# Patient Record
Sex: FEMALE | Race: WHITE | NOT HISPANIC OR LATINO | URBAN - METROPOLITAN AREA
[De-identification: names, ages, dates, MRNs, and addresses within clinical notes are randomized per-mention and may not be internally consistent; named-entity substitution may affect disease eponyms.]

---

## 2017-04-01 ENCOUNTER — INPATIENT (INPATIENT)
Facility: HOSPITAL | Age: 82
LOS: 3 days | Discharge: NURSING, REHAB OR LONG-TERM CARE | DRG: 481 | End: 2017-04-05
Attending: ORTHOPAEDIC SURGERY | Admitting: ORTHOPAEDIC SURGERY
Payer: MEDICARE

## 2017-04-01 VITALS
RESPIRATION RATE: 18 BRPM | DIASTOLIC BLOOD PRESSURE: 66 MMHG | HEART RATE: 89 BPM | SYSTOLIC BLOOD PRESSURE: 122 MMHG | OXYGEN SATURATION: 98 % | TEMPERATURE: 98 F

## 2017-04-01 DIAGNOSIS — E87.1 HYPO-OSMOLALITY AND HYPONATREMIA: ICD-10-CM

## 2017-04-01 DIAGNOSIS — D72.829 ELEVATED WHITE BLOOD CELL COUNT, UNSPECIFIED: ICD-10-CM

## 2017-04-01 LAB
ALBUMIN SERPL ELPH-MCNC: 3.1 G/DL — LOW (ref 3.4–5)
ALP SERPL-CCNC: 91 U/L — SIGNIFICANT CHANGE UP (ref 40–120)
ALT FLD-CCNC: 26 U/L — SIGNIFICANT CHANGE UP (ref 12–42)
ANION GAP SERPL CALC-SCNC: 10 MMOL/L — SIGNIFICANT CHANGE UP (ref 9–16)
APPEARANCE UR: CLEAR — SIGNIFICANT CHANGE UP
APTT BLD: 43 SEC — HIGH (ref 27.5–37.4)
AST SERPL-CCNC: 31 U/L — SIGNIFICANT CHANGE UP (ref 15–37)
BASOPHILS NFR BLD AUTO: 0.1 % — SIGNIFICANT CHANGE UP (ref 0–2)
BILIRUB SERPL-MCNC: 0.3 MG/DL — SIGNIFICANT CHANGE UP (ref 0.2–1.2)
BILIRUB UR-MCNC: NEGATIVE — SIGNIFICANT CHANGE UP
BLD GP AB SCN SERPL QL: NEGATIVE — SIGNIFICANT CHANGE UP
BUN SERPL-MCNC: 14 MG/DL — SIGNIFICANT CHANGE UP (ref 7–23)
CALCIUM SERPL-MCNC: 8.1 MG/DL — LOW (ref 8.5–10.5)
CHLORIDE SERPL-SCNC: 96 MMOL/L — SIGNIFICANT CHANGE UP (ref 96–108)
CO2 SERPL-SCNC: 25 MMOL/L — SIGNIFICANT CHANGE UP (ref 22–31)
COLOR SPEC: YELLOW — SIGNIFICANT CHANGE UP
CREAT SERPL-MCNC: 0.71 MG/DL — SIGNIFICANT CHANGE UP (ref 0.5–1.3)
DIFF PNL FLD: NEGATIVE — SIGNIFICANT CHANGE UP
GLUCOSE SERPL-MCNC: 156 MG/DL — HIGH (ref 70–99)
GLUCOSE UR QL: NEGATIVE — SIGNIFICANT CHANGE UP
HCT VFR BLD CALC: 28.6 % — LOW (ref 34.5–45)
HGB BLD-MCNC: 9.5 G/DL — LOW (ref 11.5–15.5)
INR BLD: 1.37 — HIGH (ref 0.88–1.16)
KETONES UR-MCNC: NEGATIVE — SIGNIFICANT CHANGE UP
LEUKOCYTE ESTERASE UR-ACNC: NEGATIVE — SIGNIFICANT CHANGE UP
LYMPHOCYTES # BLD AUTO: 5.2 % — LOW (ref 13–44)
MCHC RBC-ENTMCNC: 27.1 PG — SIGNIFICANT CHANGE UP (ref 27–34)
MCHC RBC-ENTMCNC: 33.2 G/DL — SIGNIFICANT CHANGE UP (ref 32–36)
MCV RBC AUTO: 81.5 FL — SIGNIFICANT CHANGE UP (ref 80–100)
MONOCYTES NFR BLD AUTO: 5.8 % — SIGNIFICANT CHANGE UP (ref 2–14)
NEUTROPHILS NFR BLD AUTO: 88.9 % — HIGH (ref 43–77)
NITRITE UR-MCNC: NEGATIVE — SIGNIFICANT CHANGE UP
PH UR: 5.5 — SIGNIFICANT CHANGE UP (ref 4–8)
PLATELET # BLD AUTO: 223 K/UL — SIGNIFICANT CHANGE UP (ref 150–400)
POTASSIUM SERPL-MCNC: 4 MMOL/L — SIGNIFICANT CHANGE UP (ref 3.5–5.3)
POTASSIUM SERPL-SCNC: 4 MMOL/L — SIGNIFICANT CHANGE UP (ref 3.5–5.3)
PROT SERPL-MCNC: 6.1 G/DL — LOW (ref 6.4–8.2)
PROT UR-MCNC: NEGATIVE MG/DL — SIGNIFICANT CHANGE UP
PROTHROM AB SERPL-ACNC: 15.3 SEC — HIGH (ref 9.8–12.7)
RBC # BLD: 3.51 M/UL — LOW (ref 3.8–5.2)
RBC # FLD: 15.7 % — SIGNIFICANT CHANGE UP (ref 10.3–16.9)
RH IG SCN BLD-IMP: POSITIVE — SIGNIFICANT CHANGE UP
SODIUM SERPL-SCNC: 131 MMOL/L — LOW (ref 135–145)
SP GR SPEC: 1.01 — SIGNIFICANT CHANGE UP (ref 1–1.03)
UROBILINOGEN FLD QL: 0.2 E.U./DL — SIGNIFICANT CHANGE UP
WBC # BLD: 11.8 K/UL — HIGH (ref 3.8–10.5)
WBC # FLD AUTO: 11.8 K/UL — HIGH (ref 3.8–10.5)

## 2017-04-01 PROCEDURE — 73502 X-RAY EXAM HIP UNI 2-3 VIEWS: CPT | Mod: 26,RT

## 2017-04-01 PROCEDURE — 73552 X-RAY EXAM OF FEMUR 2/>: CPT | Mod: 26,76,LT

## 2017-04-01 PROCEDURE — 93010 ELECTROCARDIOGRAM REPORT: CPT

## 2017-04-01 PROCEDURE — 71010: CPT | Mod: 26

## 2017-04-01 PROCEDURE — 99223 1ST HOSP IP/OBS HIGH 75: CPT | Mod: GC

## 2017-04-01 PROCEDURE — 99285 EMERGENCY DEPT VISIT HI MDM: CPT | Mod: 25

## 2017-04-01 RX ORDER — OXYCODONE HYDROCHLORIDE 5 MG/1
5 TABLET ORAL EVERY 4 HOURS
Qty: 0 | Refills: 0 | Status: DISCONTINUED | OUTPATIENT
Start: 2017-04-01 | End: 2017-04-05

## 2017-04-01 RX ORDER — TRAZODONE HCL 50 MG
100 TABLET ORAL AT BEDTIME
Qty: 0 | Refills: 0 | Status: DISCONTINUED | OUTPATIENT
Start: 2017-04-01 | End: 2017-04-05

## 2017-04-01 RX ORDER — SODIUM CHLORIDE 9 MG/ML
1000 INJECTION INTRAMUSCULAR; INTRAVENOUS; SUBCUTANEOUS
Qty: 0 | Refills: 0 | Status: DISCONTINUED | OUTPATIENT
Start: 2017-04-01 | End: 2017-04-01

## 2017-04-01 RX ORDER — SERTRALINE 25 MG/1
1 TABLET, FILM COATED ORAL
Qty: 0 | Refills: 0 | COMMUNITY

## 2017-04-01 RX ORDER — GABAPENTIN 400 MG/1
300 CAPSULE ORAL DAILY
Qty: 0 | Refills: 0 | Status: DISCONTINUED | OUTPATIENT
Start: 2017-04-02 | End: 2017-04-05

## 2017-04-01 RX ORDER — ZOLPIDEM TARTRATE 10 MG/1
1 TABLET ORAL
Qty: 0 | Refills: 0 | COMMUNITY

## 2017-04-01 RX ORDER — ZOLPIDEM TARTRATE 10 MG/1
5 TABLET ORAL AT BEDTIME
Qty: 0 | Refills: 0 | Status: DISCONTINUED | OUTPATIENT
Start: 2017-04-01 | End: 2017-04-02

## 2017-04-01 RX ORDER — DOCUSATE SODIUM 100 MG
100 CAPSULE ORAL THREE TIMES A DAY
Qty: 0 | Refills: 0 | Status: DISCONTINUED | OUTPATIENT
Start: 2017-04-01 | End: 2017-04-05

## 2017-04-01 RX ORDER — TRAZODONE HCL 50 MG
110 TABLET ORAL
Qty: 0 | Refills: 0 | COMMUNITY

## 2017-04-01 RX ORDER — ACETAMINOPHEN 500 MG
650 TABLET ORAL EVERY 6 HOURS
Qty: 0 | Refills: 0 | Status: DISCONTINUED | OUTPATIENT
Start: 2017-04-01 | End: 2017-04-05

## 2017-04-01 RX ORDER — FENTANYL CITRATE 50 UG/ML
1 INJECTION INTRAVENOUS
Qty: 0 | Refills: 0 | Status: DISCONTINUED | OUTPATIENT
Start: 2017-04-02 | End: 2017-04-05

## 2017-04-01 RX ORDER — SODIUM CHLORIDE 9 MG/ML
1000 INJECTION INTRAMUSCULAR; INTRAVENOUS; SUBCUTANEOUS
Qty: 0 | Refills: 0 | Status: DISCONTINUED | OUTPATIENT
Start: 2017-04-01 | End: 2017-04-05

## 2017-04-01 RX ORDER — MORPHINE SULFATE 50 MG/1
4 CAPSULE, EXTENDED RELEASE ORAL EVERY 4 HOURS
Qty: 0 | Refills: 0 | Status: DISCONTINUED | OUTPATIENT
Start: 2017-04-01 | End: 2017-04-05

## 2017-04-01 RX ORDER — GABAPENTIN 400 MG/1
300 CAPSULE ORAL
Qty: 0 | Refills: 0 | COMMUNITY

## 2017-04-01 RX ORDER — OXYCODONE HYDROCHLORIDE 5 MG/1
10 TABLET ORAL EVERY 4 HOURS
Qty: 0 | Refills: 0 | Status: DISCONTINUED | OUTPATIENT
Start: 2017-04-01 | End: 2017-04-05

## 2017-04-01 RX ORDER — IPRATROPIUM/ALBUTEROL SULFATE 18-103MCG
3 AEROSOL WITH ADAPTER (GRAM) INHALATION ONCE
Qty: 0 | Refills: 0 | Status: COMPLETED | OUTPATIENT
Start: 2017-04-01 | End: 2017-04-01

## 2017-04-01 RX ORDER — FUROSEMIDE 40 MG
1 TABLET ORAL
Qty: 0 | Refills: 0 | COMMUNITY

## 2017-04-01 RX ORDER — MAGNESIUM HYDROXIDE 400 MG/1
30 TABLET, CHEWABLE ORAL
Qty: 0 | Refills: 0 | Status: DISCONTINUED | OUTPATIENT
Start: 2017-04-01 | End: 2017-04-05

## 2017-04-01 RX ORDER — DOCUSATE SODIUM 100 MG
0 CAPSULE ORAL
Qty: 0 | Refills: 0 | COMMUNITY

## 2017-04-01 RX ORDER — FENTANYL CITRATE 50 UG/ML
1 INJECTION INTRAVENOUS
Qty: 0 | Refills: 0 | COMMUNITY

## 2017-04-01 RX ORDER — SERTRALINE 25 MG/1
100 TABLET, FILM COATED ORAL DAILY
Qty: 0 | Refills: 0 | Status: DISCONTINUED | OUTPATIENT
Start: 2017-04-01 | End: 2017-04-05

## 2017-04-01 RX ORDER — HYDROMORPHONE HYDROCHLORIDE 2 MG/ML
0.5 INJECTION INTRAMUSCULAR; INTRAVENOUS; SUBCUTANEOUS ONCE
Qty: 0 | Refills: 0 | Status: DISCONTINUED | OUTPATIENT
Start: 2017-04-01 | End: 2017-04-01

## 2017-04-01 RX ADMIN — Medication 3 MILLILITER(S): at 21:54

## 2017-04-01 RX ADMIN — HYDROMORPHONE HYDROCHLORIDE 0.5 MILLIGRAM(S): 2 INJECTION INTRAMUSCULAR; INTRAVENOUS; SUBCUTANEOUS at 20:30

## 2017-04-01 RX ADMIN — HYDROMORPHONE HYDROCHLORIDE 0.5 MILLIGRAM(S): 2 INJECTION INTRAMUSCULAR; INTRAVENOUS; SUBCUTANEOUS at 19:41

## 2017-04-01 NOTE — ED PROVIDER NOTE - MEDICAL DECISION MAKING DETAILS
87 y/o f right hip pain s/p mechanical fall; suspect likely fracture, xray pending, labs sent.  Will f/u results and reassess.

## 2017-04-01 NOTE — ED ADULT NURSE NOTE - OBJECTIVE STATEMENT
Pt presents to ED c/p right hip pain s/p trip and fall at home. Pt states she tripped and her leg buckled falling onto her right side. Pt denies hitting head, denies LOC. Bruising noted to right hip. Right rotation of extremity noted. pedal pulses palpable. Pt presents to ED c/p right hip pain s/p trip and fall at home. Pt states she tripped and her leg buckled falling onto her right side. Pt denies hitting head, denies LOC. Bruising noted to right hip. Right rotation of extremity noted. pedal pulses palpable. 12 mcg fentanyl patch noted to left upper arm.

## 2017-04-01 NOTE — H&P ADULT - ASSESSMENT
AP:  - OR Sunday AM 4/2  - Pain control  - Dr. Grossman clearance  - Hold DVT ppx  - NPO IVF  - Continue home meds  - Held diuretic, as per Dr. Grossman, due low Na.   -Willa  - PEBBLES

## 2017-04-01 NOTE — CONSULT NOTE ADULT - PROBLEM SELECTOR RECOMMENDATION 2
on lasix.  Hold lasix and will follow urine analysis and repeat sodium.  The etiology of hyponatremia could be related to either possible underlying CHF or Na loss with lasix

## 2017-04-01 NOTE — H&P ADULT - HISTORY OF PRESENT ILLNESS
87yo F w/ pmhx of glaucoma, constipation presents after fall on right hip and subsequent R hip IT fracture. Presents with caretaker.

## 2017-04-01 NOTE — ED ADULT TRIAGE NOTE - CHIEF COMPLAINT QUOTE
BIBA s/p trip and fall. Patient reports pain in her right hip. Denies hitting head, LOC, HA, neck pain, dizziness, CP, SOB, N/V, fever/chills. Unable to visual if leg is rotated inward or outward. No prior tx.

## 2017-04-01 NOTE — PATIENT PROFILE ADULT. - VISION (WITH CORRECTIVE LENSES IF THE PATIENT USUALLY WEARS THEM):
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

## 2017-04-01 NOTE — ED ADULT NURSE NOTE - CHPI ED SYMPTOMS NEG
no vomiting/no deformity/no abrasion/no numbness/no bleeding/no tingling/no loss of consciousness/no fever/no weakness/no confusion

## 2017-04-01 NOTE — CONSULT NOTE ADULT - PROBLEM SELECTOR RECOMMENDATION 4
The patient's medical condition is optimized for surgery.  There is no contraindication for surgery.  There is no clinical evidence neither of angina, arrhthymias, nor valvular disease.   There is limitation of exercise capacity.    ASA class is .  Hayes cardiac risk factor is  high.  DVT prophylaxis is indicated.  Pain control.  Early mobilization.  Avoid fluid overload.  I discussed the case with the cardiology and for ECHO.  Started her on Bronchodilator.  She is to be admitted to monitor bed postop

## 2017-04-01 NOTE — ED PROVIDER NOTE - MUSCULOSKELETAL, MLM
diffuse tenderness to right hip which is externally rotated, limited ROM 2/2 pain, strength 5/5, sensation intact distally

## 2017-04-01 NOTE — ED PROVIDER NOTE - OBJECTIVE STATEMENT
85 y/o f hx HTN presents c/o fall tonight at home.  Pt stating she felt her leg "give out" while walking in her hallway, fell down on her right side hitting right hip.  Pt stating having pain in right hip, unable to bear weight.  Pt denies hitting head, LOC, numbness/tingling to ext, weakness, all other ROS negative.

## 2017-04-01 NOTE — ED PROVIDER NOTE - ATTENDING CONTRIBUTION TO CARE
mechanical fall with right hip pain, unable to weight bear.  xray with fractured hip.  ortho consulted.  admit for operative management

## 2017-04-01 NOTE — CONSULT NOTE ADULT - SUBJECTIVE AND OBJECTIVE BOX
Patient is a 86y old  Female who presents with a chief complaint of     HPI:      PAST MEDICAL & SURGICAL HISTORY:      FAMILY HISTORY:      SOCIAL HISTORY:  Smoking Status: [ ] Current, [ ] Former, [x ] Never  Pack Years:    MEDICATIONS:  Pulmonary:    Antimicrobials:    Anticoagulants:    Onc:    GI/:    Endocrine:    Cardiac:    Other Medications:      Allergies    chocolate (Unknown)  No Known Drug Allergies    Intolerances        Vital Signs Last 24 Hrs  T(C): 36.8, Max: 36.8 (04-01 @ 18:59)  T(F): 98.2, Max: 98.2 (04-01 @ 18:59)  HR: 89 (89 - 89)  BP: 122/66 (122/66 - 122/66)  BP(mean): --  RR: 18 (18 - 18)  SpO2: 98% (98% - 98%)        LABS:      CBC Full  -  ( 01 Apr 2017 21:09 )  WBC Count : 11.8 K/uL  Hemoglobin : 9.5 g/dL  Hematocrit : 28.6 %  Platelet Count - Automated : 223 K/uL  Mean Cell Volume : 81.5 fL  Mean Cell Hemoglobin : 27.1 pg  Mean Cell Hemoglobin Concentration : 33.2 g/dL  Auto Neutrophil # : x  Auto Lymphocyte # : x  Auto Monocyte # : x  Auto Eosinophil # : x  Auto Basophil # : x  Auto Neutrophil % : 88.9 %  Auto Lymphocyte % : 5.2 %  Auto Monocyte % : 5.8 %  Auto Eosinophil % : x  Auto Basophil % : 0.1 %    01 Apr 2017 21:09    131    |  96     |  14     ----------------------------<  156    4.0     |  25     |  0.71     Ca    8.1        01 Apr 2017 21:09    TPro  6.1    /  Alb  3.1    /  TBili  0.3    /  DBili  x      /  AST  31     /  ALT  26     /  AlkPhos  91     01 Apr 2017 21:09    PT/INR - ( 01 Apr 2017 21:09 )   PT: 15.3 sec;   INR: 1.37          PTT - ( 01 Apr 2017 21:09 )  PTT:43.0 sec  CXR cardiomegaly, PVC and deviated trachea.  EKG                  RADIOLOGY & ADDITIONAL STUDIES (The following images were personally reviewed): Patient is a 86y old  Female who presents with a chief complaint of     HPI: she had a fall in bathroom and she fx her leg.  No head trauma      PAST MEDICAL & SURGICAL HISTORY:  leg edema  No surgical history    FAMILY HISTORY:  noncontributary    SOCIAL HISTORY:  Smoking Status: [ ] Current, [ ] Former, [x ] Never  Pack Years:    MEDICATIONS: Lasiz  Pulmonary:    Antimicrobials:    Anticoagulants:    Onc:    GI/:    Endocrine:    Cardiac:    Other Medications:      Allergies    chocolate (Unknown)  No Known Drug Allergies    Intolerances        Vital Signs Last 24 Hrs  T(C): 36.8, Max: 36.8 (04-01 @ 18:59)  T(F): 98.2, Max: 98.2 (04-01 @ 18:59)  HR: 89 (89 - 89)  BP: 122/66 (122/66 - 122/66)  BP(mean): --  RR: 18 (18 - 18)  SpO2: 98% (98% - 98%)        LABS:      CBC Full  -  ( 01 Apr 2017 21:09 )  WBC Count : 11.8 K/uL  Hemoglobin : 9.5 g/dL  Hematocrit : 28.6 %  Platelet Count - Automated : 223 K/uL  Mean Cell Volume : 81.5 fL  Mean Cell Hemoglobin : 27.1 pg  Mean Cell Hemoglobin Concentration : 33.2 g/dL  Auto Neutrophil # : x  Auto Lymphocyte # : x  Auto Monocyte # : x  Auto Eosinophil # : x  Auto Basophil # : x  Auto Neutrophil % : 88.9 %  Auto Lymphocyte % : 5.2 %  Auto Monocyte % : 5.8 %  Auto Eosinophil % : x  Auto Basophil % : 0.1 %    01 Apr 2017 21:09    131    |  96     |  14     ----------------------------<  156    4.0     |  25     |  0.71     Ca    8.1        01 Apr 2017 21:09    TPro  6.1    /  Alb  3.1    /  TBili  0.3    /  DBili  x      /  AST  31     /  ALT  26     /  AlkPhos  91     01 Apr 2017 21:09    PT/INR - ( 01 Apr 2017 21:09 )   PT: 15.3 sec;   INR: 1.37          PTT - ( 01 Apr 2017 21:09 )  PTT:43.0 sec  CXR cardiomegaly, PVC and deviated trachea.  EKG RSR nonspecific st-t changes                  RADIOLOGY & ADDITIONAL STUDIES (The following images were personally reviewed):

## 2017-04-02 DIAGNOSIS — Z01.818 ENCOUNTER FOR OTHER PREPROCEDURAL EXAMINATION: ICD-10-CM

## 2017-04-02 DIAGNOSIS — S72.009A FRACTURE OF UNSPECIFIED PART OF NECK OF UNSPECIFIED FEMUR, INITIAL ENCOUNTER FOR CLOSED FRACTURE: ICD-10-CM

## 2017-04-02 DIAGNOSIS — J98.01 ACUTE BRONCHOSPASM: ICD-10-CM

## 2017-04-02 LAB
HCT VFR BLD CALC: 25.7 % — LOW (ref 34.5–45)
HGB BLD-MCNC: 8.4 G/DL — LOW (ref 11.5–15.5)
MCHC RBC-ENTMCNC: 26.8 PG — LOW (ref 27–34)
MCHC RBC-ENTMCNC: 32.7 G/DL — SIGNIFICANT CHANGE UP (ref 32–36)
MCV RBC AUTO: 81.8 FL — SIGNIFICANT CHANGE UP (ref 80–100)
MRSA PCR RESULT.: NEGATIVE — SIGNIFICANT CHANGE UP
PLATELET # BLD AUTO: 246 K/UL — SIGNIFICANT CHANGE UP (ref 150–400)
RBC # BLD: 3.14 M/UL — LOW (ref 3.8–5.2)
RBC # FLD: 15.8 % — SIGNIFICANT CHANGE UP (ref 10.3–16.9)
S AUREUS DNA NOSE QL NAA+PROBE: NEGATIVE — SIGNIFICANT CHANGE UP
WBC # BLD: 8.4 K/UL — SIGNIFICANT CHANGE UP (ref 3.8–10.5)
WBC # FLD AUTO: 8.4 K/UL — SIGNIFICANT CHANGE UP (ref 3.8–10.5)

## 2017-04-02 PROCEDURE — 99232 SBSQ HOSP IP/OBS MODERATE 35: CPT | Mod: GC

## 2017-04-02 PROCEDURE — 93306 TTE W/DOPPLER COMPLETE: CPT | Mod: 26

## 2017-04-02 RX ORDER — ALBUTEROL 90 UG/1
1 AEROSOL, METERED ORAL EVERY 4 HOURS
Qty: 0 | Refills: 0 | Status: DISCONTINUED | OUTPATIENT
Start: 2017-04-02 | End: 2017-04-05

## 2017-04-02 RX ORDER — ZOLPIDEM TARTRATE 10 MG/1
5 TABLET ORAL AT BEDTIME
Qty: 0 | Refills: 0 | Status: DISCONTINUED | OUTPATIENT
Start: 2017-04-02 | End: 2017-04-02

## 2017-04-02 RX ORDER — ZALEPLON 10 MG
5 CAPSULE ORAL AT BEDTIME
Qty: 0 | Refills: 0 | Status: DISCONTINUED | OUTPATIENT
Start: 2017-04-02 | End: 2017-04-02

## 2017-04-02 RX ORDER — FUROSEMIDE 40 MG
20 TABLET ORAL EVERY 12 HOURS
Qty: 0 | Refills: 0 | Status: DISCONTINUED | OUTPATIENT
Start: 2017-04-02 | End: 2017-04-05

## 2017-04-02 RX ORDER — ZOLPIDEM TARTRATE 10 MG/1
5 TABLET ORAL AT BEDTIME
Qty: 0 | Refills: 0 | Status: DISCONTINUED | OUTPATIENT
Start: 2017-04-02 | End: 2017-04-05

## 2017-04-02 RX ORDER — ALBUTEROL 90 UG/1
2.5 AEROSOL, METERED ORAL EVERY 6 HOURS
Qty: 0 | Refills: 0 | Status: DISCONTINUED | OUTPATIENT
Start: 2017-04-02 | End: 2017-04-03

## 2017-04-02 RX ORDER — LATANOPROST 0.05 MG/ML
1 SOLUTION/ DROPS OPHTHALMIC; TOPICAL AT BEDTIME
Qty: 0 | Refills: 0 | Status: DISCONTINUED | OUTPATIENT
Start: 2017-04-02 | End: 2017-04-05

## 2017-04-02 RX ADMIN — ALBUTEROL 2.5 MILLIGRAM(S): 90 AEROSOL, METERED ORAL at 23:57

## 2017-04-02 RX ADMIN — ALBUTEROL 2.5 MILLIGRAM(S): 90 AEROSOL, METERED ORAL at 17:00

## 2017-04-02 RX ADMIN — Medication 5 MILLIGRAM(S): at 01:08

## 2017-04-02 RX ADMIN — MORPHINE SULFATE 4 MILLIGRAM(S): 50 CAPSULE, EXTENDED RELEASE ORAL at 17:00

## 2017-04-02 RX ADMIN — MORPHINE SULFATE 4 MILLIGRAM(S): 50 CAPSULE, EXTENDED RELEASE ORAL at 00:08

## 2017-04-02 RX ADMIN — MORPHINE SULFATE 4 MILLIGRAM(S): 50 CAPSULE, EXTENDED RELEASE ORAL at 00:23

## 2017-04-02 RX ADMIN — Medication 100 MILLIGRAM(S): at 12:02

## 2017-04-02 RX ADMIN — Medication 20 MILLIGRAM(S): at 23:57

## 2017-04-02 RX ADMIN — Medication 100 MILLIGRAM(S): at 22:20

## 2017-04-02 RX ADMIN — FENTANYL CITRATE 1 PATCH: 50 INJECTION INTRAVENOUS at 17:00

## 2017-04-02 RX ADMIN — MORPHINE SULFATE 4 MILLIGRAM(S): 50 CAPSULE, EXTENDED RELEASE ORAL at 11:18

## 2017-04-02 RX ADMIN — Medication 20 MILLIGRAM(S): at 22:20

## 2017-04-02 RX ADMIN — MORPHINE SULFATE 4 MILLIGRAM(S): 50 CAPSULE, EXTENDED RELEASE ORAL at 10:18

## 2017-04-02 RX ADMIN — SERTRALINE 100 MILLIGRAM(S): 25 TABLET, FILM COATED ORAL at 12:02

## 2017-04-02 RX ADMIN — LATANOPROST 1 DROP(S): 0.05 SOLUTION/ DROPS OPHTHALMIC; TOPICAL at 22:20

## 2017-04-02 RX ADMIN — GABAPENTIN 300 MILLIGRAM(S): 400 CAPSULE ORAL at 12:02

## 2017-04-02 RX ADMIN — Medication 20 MILLIGRAM(S): at 12:02

## 2017-04-02 RX ADMIN — MORPHINE SULFATE 4 MILLIGRAM(S): 50 CAPSULE, EXTENDED RELEASE ORAL at 17:17

## 2017-04-02 RX ADMIN — ALBUTEROL 2.5 MILLIGRAM(S): 90 AEROSOL, METERED ORAL at 08:55

## 2017-04-02 RX ADMIN — SODIUM CHLORIDE 50 MILLILITER(S): 9 INJECTION INTRAMUSCULAR; INTRAVENOUS; SUBCUTANEOUS at 00:07

## 2017-04-02 RX ADMIN — ZOLPIDEM TARTRATE 5 MILLIGRAM(S): 10 TABLET ORAL at 23:57

## 2017-04-02 NOTE — DIETITIAN INITIAL EVALUATION ADULT. - OTHER INFO
87y/o woman admitted w/ R hip fx s/p fall at home. Pt denies N/V/D. Pt w/ a hx of constipation (dulcolax, colace currently ordered). NKFA. No chewing/swallowing issues. Skin is intact. Pt c/o 10/10 hip pain (RN aware). Pt does not follow a therapeutic diet at home, no restrictions. NKFA. Pt was NPO for possible OR, now Regular diet as OR scheduled for tomorrow.

## 2017-04-02 NOTE — CHART NOTE - NSCHARTNOTEFT_GEN_A_CORE
Upon Nutritional Assessment by the Registered Dietitian your patient was determined to meet criteria / has evidence of the following diagnosis/diagnoses:          [ ]  Mild Protein Calorie Malnutrition        [ ]  Moderate Protein Calorie Malnutrition        [ ] Severe Protein Calorie Malnutrition        [ ] Unspecified Protein Calorie Malnutrition        [ ] Underweight / BMI <19        [ X] Morbid Obesity / BMI > 40      Findings as based on:  •  Comprehensive nutrition assessment and consultation  •  Calorie counts (nutrient intake analysis)  •  Food acceptance and intake status from observations by staff  •  Follow up  •  Patient education  •  Intervention secondary to interdisciplinary rounds  •   concerns      Treatment:    The following diet has been recommended:      PROVIDER Section:     By signing this assessment you are acknowledging and agree with the diagnosis/diagnoses assigned by the Registered Dietitian    Comments:

## 2017-04-02 NOTE — PROGRESS NOTE ADULT - SUBJECTIVE AND OBJECTIVE BOX
SUBJECTIVE: Patient seen and examined.  Wheezing this morning.      OBJECTIVE:     Vital Signs Last 24 Hrs  T(C): 37.6, Max: 37.6 (04-02 @ 05:15)  T(F): 99.6, Max: 99.6 (04-02 @ 05:15)  HR: 100 (89 - 100)  BP: 159/75 (122/66 - 159/75)  BP(mean): --  RR: 16 (16 - 18)  SpO2: 95% (95% - 98%)                Dressing: clean/dry/intact            Motor exam:  intact but limited due to pain         Sensation:   Sensation intact all dermatomes         Vascular:  Warm/pink/well perfused               A/P :   R femoral IT fx  -    DVT ppx: held  -    Weight bearing status:  NWB  - tripathi  - possible or today  -pending medical clearance

## 2017-04-02 NOTE — CONSULT NOTE ADULT - SUBJECTIVE AND OBJECTIVE BOX
Patient is a 86y old  Female who presents with a chief complaint of R hip pain post fall (2017 22:51)      HPI:  85yo F w/ pmhx of glaucoma, constipation presents after fall on right hip and subsequent R hip IT fracture. Presents with caretaker. (2017 22:51).No complaints of chest pain or dyspnea. No recent decrease in exertional tolerance      PAST MEDICAL & SURGICAL HISTORY:  Glaucoma  No significant past surgical history  No history of MI, angina, arrhythmia, hypertension  obesity    MEDICATIONS  (STANDING):  fentaNYL   Patch  12 MICROgram(s)/Hr 1Patch Transdermal every 72 hours  gabapentin 300milliGRAM(s) Oral daily  traZODone 100milliGRAM(s) Oral at bedtime  sertraline 100milliGRAM(s) Oral daily  docusate sodium 100milliGRAM(s) Oral three times a day  sodium chloride 0.9%. 1000milliLiter(s) IV Continuous <Continuous>  ALBUTerol    0.083% 2.5milliGRAM(s) Nebulizer every 6 hours  ALBUTerol    90 MICROgram(s) HFA Inhaler 1Puff(s) Inhalation every 4 hours    MEDICATIONS  (PRN):  acetaminophen   Tablet 650milliGRAM(s) Oral every 6 hours PRN For Temp greater than 38 C (100.4 F)  acetaminophen   Tablet. 650milliGRAM(s) Oral every 6 hours PRN Mild Pain (1 - 3)  oxyCODONE IR 10milliGRAM(s) Oral every 4 hours PRN Severe Pain (7 - 10)  oxyCODONE IR 5milliGRAM(s) Oral every 4 hours PRN Moderate Pain (4 - 6)  morphine  - Injectable 4milliGRAM(s) IV Push every 4 hours PRN Severe Pain (7 - 10) IF NPO OR UNRELIEVED WITH OXYCODONE 10MG  magnesium hydroxide Suspension 30milliLiter(s) Oral two times a day PRN Constipation  zaleplon 5milliGRAM(s) Oral at bedtime PRN Insomnia      FAMILY HISTORY:  No pertinent family history in first degree relatives      SOCIAL HISTORY: Non-smoker    REVIEW OF SYSTEM:	    Respiratory and Thorax:negatiave	  Cardiovascular:	negative  Gastrointestinal:	negative  Genitourinary: negative  Musculoskeletal:right hip pain  Neurological:negative  Psychiatric:anxious  	    Vital Signs Last 24 Hrs  T(C): 37.6, Max: 37.6 ( @ 05:15)  T(F): 99.6, Max: 99.6 ( @ 05:15)  HR: 100 (89 - 100)  BP: 159/75 (122/66 - 159/75)  BP(mean): --  RR: 16 (16 - 18)  SpO2: 95% (95% - 98%)    PHYSICAL EXAM:    Neck:-JVD, -bruits    Respiratory: rhonchi and wheezing throughout both lung fields    Cardiovascular:reg S1S2, -mrg    Gastrointestinal:soft +BS    Extremities:right leg externally rotated, +2 leg edema    INTERPRETATION OF TELEMETRY:    ECG:NSR ST segment abnormalities    I&O's Detail    I & Os for current day (as of 2017 09:00)  =============================================  IN:    Total IN: 0 ml  ---------------------------------------------  OUT:    Indwelling Catheter - Urethral: 1300 ml    Total OUT: 1300 ml  ---------------------------------------------  Total NET: -1300 ml      LABS:                        9.5    11.8  )-----------( 223      ( 2017 21:09 )             28.6     2017 21:09    131    |  96     |  14     ----------------------------<  156    4.0     |  25     |  0.71     Ca    8.1        2017 21:09    TPro  6.1    /  Alb  3.1    /  TBili  0.3    /  DBili  x      /  AST  31     /  ALT  26     /  AlkPhos  91     2017 21:09        PT/INR - ( 2017 21:09 )   PT: 15.3 sec;   INR: 1.37          PTT - ( 2017 21:09 )  PTT:43.0 sec  Urinalysis Basic - ( 2017 23:01 )    Color: Yellow / Appearance: Clear / S.015 / pH: x  Gluc: x / Ketone: NEGATIVE  / Bili: NEGATIVE / Urobili: 0.2 E.U./dL   Blood: x / Protein: NEGATIVE mg/dL / Nitrite: NEGATIVE   Leuk Esterase: NEGATIVE / RBC: x / WBC x   Sq Epi: x / Non Sq Epi: x / Bacteria: x      I&O's Summary    I & Os for current day (as of 2017 09:00)  =============================================  IN: 0 ml / OUT: 1300 ml / NET: -1300 ml    Bedside ECHO- normal EF, no wall motion abnormalities, no significant valvular abnormalities , IVC collapses normally, does not meet criteria for diastolic heart failure    A/P  Morbidly obese, elderly woman with right hip fracture   Leg edema probably secondary to venous insufficiency, currently right leg more swollen then left probably due to fracture.   Rhonchi- probably secondary to a pulmonary issue. She has rhonchi throughout. I do not feel she is stable for surgery currently. Will discuss with Dr. Grossman. Resume her lasix. Watch her sodium level

## 2017-04-02 NOTE — PRE-OP CHECKLIST - SELECT TESTS ORDERED
PT/PTT/INR/Urinalysis/EKG/CBC/Type and Screen/CXR/Nasal Swab done/Results in MD note/Type and Cross/BMP

## 2017-04-02 NOTE — PROGRESS NOTE ADULT - SUBJECTIVE AND OBJECTIVE BOX
Ortho Preop Note    Patient is a 86y old  Female who presents with a chief complaint of R hip pain post fall (2017 22:51)    Diagnosis: right it fracture  Procedure: IMN  Surgeon: Boris                          9.5    11.8  )-----------( 223      ( 2017 21:09 )             28.6     2017 21:09    131    |  96     |  14     ----------------------------<  156    4.0     |  25     |  0.71     Ca    8.1        2017 21:09    TPro  6.1    /  Alb  3.1    /  TBili  0.3    /  DBili  x      /  AST  31     /  ALT  26     /  AlkPhos  91     2017 21:09    PT/INR - ( 2017 21:09 )   PT: 15.3 sec;   INR: 1.37          PTT - ( 2017 21:09 )  PTT:43.0 sec  Urinalysis Basic - ( 2017 23:01 )    Color: Yellow / Appearance: Clear / S.015 / pH: x  Gluc: x / Ketone: NEGATIVE  / Bili: NEGATIVE / Urobili: 0.2 E.U./dL   Blood: x / Protein: NEGATIVE mg/dL / Nitrite: NEGATIVE   Leuk Esterase: NEGATIVE / RBC: x / WBC x   Sq Epi: x / Non Sq Epi: x / Bacteria: x        [x] Type & Screen  [x] CBC  [x] BMP  [x] PT/PTT/INR  [x] Urinalysis  [x] Chest X-ray  [x] EKG  [x] NPO/IVF  [x] Consent  [ ] Clearance  [x] Added on to OR Schedule  [x] Anti-coagulation held  [x] MRSA/MSSA Nasal Screen

## 2017-04-02 NOTE — DIETITIAN INITIAL EVALUATION ADULT. - ENERGY NEEDS
Wt: 111kg, Ht: 64" IBW: 54kg, %IBW: 205%, BMI: 42  IBW used for calculations as pt >120% of IBW   Needs estimated for age and current medical status  Fluid for chronic constipation

## 2017-04-03 LAB
ANION GAP SERPL CALC-SCNC: 8 MMOL/L — LOW (ref 9–16)
BUN SERPL-MCNC: 18 MG/DL — SIGNIFICANT CHANGE UP (ref 7–23)
CALCIUM SERPL-MCNC: 8 MG/DL — LOW (ref 8.5–10.5)
CHLORIDE SERPL-SCNC: 98 MMOL/L — SIGNIFICANT CHANGE UP (ref 96–108)
CO2 SERPL-SCNC: 29 MMOL/L — SIGNIFICANT CHANGE UP (ref 22–31)
CREAT SERPL-MCNC: 0.65 MG/DL — SIGNIFICANT CHANGE UP (ref 0.5–1.3)
GLUCOSE SERPL-MCNC: 162 MG/DL — HIGH (ref 70–99)
HCT VFR BLD CALC: 24.8 % — LOW (ref 34.5–45)
HCT VFR BLD CALC: 30.7 % — LOW (ref 34.5–45)
HGB BLD-MCNC: 10.5 G/DL — LOW (ref 11.5–15.5)
HGB BLD-MCNC: 8 G/DL — LOW (ref 11.5–15.5)
MCHC RBC-ENTMCNC: 26.1 PG — LOW (ref 27–34)
MCHC RBC-ENTMCNC: 28.1 PG — SIGNIFICANT CHANGE UP (ref 27–34)
MCHC RBC-ENTMCNC: 32.3 G/DL — SIGNIFICANT CHANGE UP (ref 32–36)
MCHC RBC-ENTMCNC: 34.2 G/DL — SIGNIFICANT CHANGE UP (ref 32–36)
MCV RBC AUTO: 80.8 FL — SIGNIFICANT CHANGE UP (ref 80–100)
MCV RBC AUTO: 82.1 FL — SIGNIFICANT CHANGE UP (ref 80–100)
PLATELET # BLD AUTO: 192 K/UL — SIGNIFICANT CHANGE UP (ref 150–400)
PLATELET # BLD AUTO: 256 K/UL — SIGNIFICANT CHANGE UP (ref 150–400)
POTASSIUM SERPL-MCNC: 3.4 MMOL/L — LOW (ref 3.5–5.3)
POTASSIUM SERPL-SCNC: 3.4 MMOL/L — LOW (ref 3.5–5.3)
RBC # BLD: 3.07 M/UL — LOW (ref 3.8–5.2)
RBC # BLD: 3.74 M/UL — LOW (ref 3.8–5.2)
RBC # FLD: 15.4 % — SIGNIFICANT CHANGE UP (ref 10.3–16.9)
RBC # FLD: 16.1 % — SIGNIFICANT CHANGE UP (ref 10.3–16.9)
SODIUM SERPL-SCNC: 135 MMOL/L — SIGNIFICANT CHANGE UP (ref 135–145)
WBC # BLD: 15 K/UL — HIGH (ref 3.8–10.5)
WBC # BLD: 9.2 K/UL — SIGNIFICANT CHANGE UP (ref 3.8–10.5)
WBC # FLD AUTO: 15 K/UL — HIGH (ref 3.8–10.5)
WBC # FLD AUTO: 9.2 K/UL — SIGNIFICANT CHANGE UP (ref 3.8–10.5)

## 2017-04-03 PROCEDURE — 99233 SBSQ HOSP IP/OBS HIGH 50: CPT

## 2017-04-03 PROCEDURE — 99232 SBSQ HOSP IP/OBS MODERATE 35: CPT | Mod: GC

## 2017-04-03 RX ORDER — IPRATROPIUM/ALBUTEROL SULFATE 18-103MCG
3 AEROSOL WITH ADAPTER (GRAM) INHALATION EVERY 6 HOURS
Qty: 0 | Refills: 0 | Status: DISCONTINUED | OUTPATIENT
Start: 2017-04-03 | End: 2017-04-05

## 2017-04-03 RX ORDER — HYDRALAZINE HCL 50 MG
10 TABLET ORAL ONCE
Qty: 0 | Refills: 0 | Status: COMPLETED | OUTPATIENT
Start: 2017-04-03 | End: 2017-04-03

## 2017-04-03 RX ORDER — CEFAZOLIN SODIUM 1 G
2000 VIAL (EA) INJECTION EVERY 8 HOURS
Qty: 0 | Refills: 0 | Status: COMPLETED | OUTPATIENT
Start: 2017-04-04 | End: 2017-04-04

## 2017-04-03 RX ORDER — ENOXAPARIN SODIUM 100 MG/ML
30 INJECTION SUBCUTANEOUS
Qty: 0 | Refills: 0 | Status: DISCONTINUED | OUTPATIENT
Start: 2017-04-03 | End: 2017-04-05

## 2017-04-03 RX ADMIN — GABAPENTIN 300 MILLIGRAM(S): 400 CAPSULE ORAL at 12:41

## 2017-04-03 RX ADMIN — LATANOPROST 1 DROP(S): 0.05 SOLUTION/ DROPS OPHTHALMIC; TOPICAL at 23:03

## 2017-04-03 RX ADMIN — Medication 3 MILLILITER(S): at 12:50

## 2017-04-03 RX ADMIN — Medication 20 MILLIGRAM(S): at 22:47

## 2017-04-03 RX ADMIN — MORPHINE SULFATE 4 MILLIGRAM(S): 50 CAPSULE, EXTENDED RELEASE ORAL at 07:26

## 2017-04-03 RX ADMIN — Medication 60 MILLIGRAM(S): at 09:08

## 2017-04-03 RX ADMIN — Medication 40 MILLIGRAM(S): at 23:03

## 2017-04-03 RX ADMIN — OXYCODONE HYDROCHLORIDE 10 MILLIGRAM(S): 5 TABLET ORAL at 12:20

## 2017-04-03 RX ADMIN — OXYCODONE HYDROCHLORIDE 10 MILLIGRAM(S): 5 TABLET ORAL at 23:35

## 2017-04-03 RX ADMIN — Medication 100 MILLIGRAM(S): at 22:46

## 2017-04-03 RX ADMIN — OXYCODONE HYDROCHLORIDE 10 MILLIGRAM(S): 5 TABLET ORAL at 15:40

## 2017-04-03 RX ADMIN — ALBUTEROL 2.5 MILLIGRAM(S): 90 AEROSOL, METERED ORAL at 06:44

## 2017-04-03 RX ADMIN — OXYCODONE HYDROCHLORIDE 10 MILLIGRAM(S): 5 TABLET ORAL at 22:47

## 2017-04-03 RX ADMIN — MORPHINE SULFATE 4 MILLIGRAM(S): 50 CAPSULE, EXTENDED RELEASE ORAL at 07:11

## 2017-04-03 RX ADMIN — Medication 3 MILLILITER(S): at 20:03

## 2017-04-03 RX ADMIN — Medication 20 MILLIGRAM(S): at 12:41

## 2017-04-03 RX ADMIN — SERTRALINE 100 MILLIGRAM(S): 25 TABLET, FILM COATED ORAL at 12:41

## 2017-04-03 RX ADMIN — Medication 10 MILLIGRAM(S): at 20:56

## 2017-04-03 RX ADMIN — OXYCODONE HYDROCHLORIDE 10 MILLIGRAM(S): 5 TABLET ORAL at 11:12

## 2017-04-03 NOTE — PROGRESS NOTE ADULT - SUBJECTIVE AND OBJECTIVE BOX
Orthopaedics Post Op Check    Procedure: R IMN  Surgeon: Boris    Pt comfortable, without complaints  Denies CP, SOB, N/V, numbness/tingling     Vital Signs Last 24 Hrs  T(C): 36.6, Max: 37.4 (04-03 @ 09:30)  T(F): 97.8, Max: 99.3 (04-03 @ 09:30)  HR: 88 (76 - 105)  BP: 148/62 (129/63 - 193/79)  BP(mean): --  RR: 14 (12 - 27)  SpO2: 100% (94% - 100%)  AVSS, NAD    Dressing C/D/I  General: Pt Alert and oriented   WWP  mod swelling seen distally b/l LE  Sensation: intact  Motor: only limited by pain at sx site, able to flex and extend ankle                          10.5   15.0  )-----------( 256      ( 03 Apr 2017 19:50 )             30.7   03 Apr 2017 07:44    135    |  98     |  18     ----------------------------<  162    3.4     |  29     |  0.65     Ca    8.0        03 Apr 2017 07:44      Post op XR:    A/P: 86yFemale POD#0 s/p R IMN w/ prevena  - Stable  - Pain Control  - DVT ppx: lovenox 30BID  - Post op abx: ancef  - PT, WBS: WBAT  - F/U AM Labs

## 2017-04-03 NOTE — PROGRESS NOTE ADULT - SUBJECTIVE AND OBJECTIVE BOX
Interval Events: reviewed  Patient seen and examined at bedside.    Patient is a 86y old  Female who presents with a chief complaint of R hip pain post fall (2017 22:51)  she is doing better but she is still wheezing    PAST MEDICAL & SURGICAL HISTORY:  Glaucoma  No significant past surgical history      MEDICATIONS:  Pulmonary:  ALBUTerol    90 MICROgram(s) HFA Inhaler 1Puff(s) Inhalation every 4 hours  ALBUTerol/ipratropium for Nebulization 3milliLiter(s) Nebulizer every 6 hours    Antimicrobials:    Anticoagulants:  enoxaparin Injectable 30milliGRAM(s) SubCutaneous two times a day    Cardiac:  furosemide   Injectable 20milliGRAM(s) IV Push every 12 hours      Allergies    chocolate (Unknown)  No Known Drug Allergies    Intolerances        Vital Signs Last 24 Hrs  T(C): 36.7, Max: 37.4 ( @ 09:30)  T(F): 98, Max: 99.3 ( @ 09:30)  HR: 91 (76 - 105)  BP: 146/61 (129/63 - 193/79)  BP(mean): --  RR: 15 (12 - 27)  SpO2: 100% (94% - 100%)  I & Os for 24h ending  @ 07:00  =============================================  IN: 0 ml / OUT: 1950 ml / NET: -1950 ml    I & Os for current day (as of  @ 22:11)  =============================================  IN: 100 ml / OUT: 1675 ml / NET: -1575 ml        LABS:      CBC Full  -  ( 2017 19:50 )  WBC Count : 15.0 K/uL  Hemoglobin : 10.5 g/dL  Hematocrit : 30.7 %  Platelet Count - Automated : 256 K/uL  Mean Cell Volume : 82.1 fL  Mean Cell Hemoglobin : 28.1 pg  Mean Cell Hemoglobin Concentration : 34.2 g/dL  Auto Neutrophil # : x  Auto Lymphocyte # : x  Auto Monocyte # : x  Auto Eosinophil # : x  Auto Basophil # : x  Auto Neutrophil % : x  Auto Lymphocyte % : x  Auto Monocyte % : x  Auto Eosinophil % : x  Auto Basophil % : x    2017 07:44    135    |  98     |  18     ----------------------------<  162    3.4     |  29     |  0.65     Ca    8.0        2017 07:44            Urinalysis Basic - ( 2017 23:01 )    Color: Yellow / Appearance: Clear / S.015 / pH: x  Gluc: x / Ketone: NEGATIVE  / Bili: NEGATIVE / Urobili: 0.2 E.U./dL   Blood: x / Protein: NEGATIVE mg/dL / Nitrite: NEGATIVE   Leuk Esterase: NEGATIVE / RBC: x / WBC x   Sq Epi: x / Non Sq Epi: x / Bacteria: x              Culture Results:   No growth to date ( @ 08:20)      RADIOLOGY & ADDITIONAL STUDIES (The following images were personally reviewed):  Crouch:                                  No  Urine output:               Yes          DVT prophylaxis:         Yes          Flattus:                          Yes         Bowel movement:       Yes

## 2017-04-03 NOTE — PROGRESS NOTE ADULT - SUBJECTIVE AND OBJECTIVE BOX
SUBJECTIVE: Patient seen and examined.  No wheezing this AM. B/L LE swelling present still.    OBJECTIVE:     Vital Signs Last 24 Hrs  T(C): 36.9, Max: 37.2 (04-02 @ 09:31)  T(F): 98.5, Max: 98.9 (04-02 @ 09:31)  HR: 79 (79 - 84)  BP: 109/56 (109/56 - 157/77)  BP(mean): --  RR: 16 (16 - 16)  SpO2: 96% (95% - 96%)                Motor exam:  intact but limited due to pain RLE         Sensation:   SILT b/l LE         Vascular:  Warm/pink/well perfused               A/P :   R femoral IT fx  - DVT ppx: held  - Weight bearing status:  NWB  - tripathi  - OR when cleared   - pending medical / cardiac clearance

## 2017-04-03 NOTE — BRIEF OPERATIVE NOTE - PRE-OP DX
Closed hip fracture requiring operative repair, right, initial encounter  04/03/2017    Active  Cecil Pimentel

## 2017-04-03 NOTE — PROGRESS NOTE ADULT - SUBJECTIVE AND OBJECTIVE BOX
INTERVAL HISTORY:  No c/o cp, sob . Lying flat comfortably  	      MEDICATIONS:  furosemide   Injectable 20milliGRAM(s) IV Push every 12 hours      ALBUTerol    90 MICROgram(s) HFA Inhaler 1Puff(s) Inhalation every 4 hours  ALBUTerol/ipratropium for Nebulization 3milliLiter(s) Nebulizer every 6 hours    fentaNYL   Patch  12 MICROgram(s)/Hr 1Patch Transdermal every 72 hours  gabapentin 300milliGRAM(s) Oral daily  traZODone 100milliGRAM(s) Oral at bedtime  sertraline 100milliGRAM(s) Oral daily  acetaminophen   Tablet 650milliGRAM(s) Oral every 6 hours PRN  acetaminophen   Tablet. 650milliGRAM(s) Oral every 6 hours PRN  oxyCODONE IR 10milliGRAM(s) Oral every 4 hours PRN  oxyCODONE IR 5milliGRAM(s) Oral every 4 hours PRN  morphine  - Injectable 4milliGRAM(s) IV Push every 4 hours PRN  zolpidem 5milliGRAM(s) Oral at bedtime PRN    docusate sodium 100milliGRAM(s) Oral three times a day  magnesium hydroxide Suspension 30milliLiter(s) Oral two times a day PRN  bisacodyl Suppository 10milliGRAM(s) Rectal at bedtime PRN  bisacodyl 20milliGRAM(s) Oral at bedtime    methylPREDNISolone sodium succinate Injectable 60milliGRAM(s) IV Push once  methylPREDNISolone sodium succinate Injectable 40milliGRAM(s) IV Push every 12 hours    sodium chloride 0.9%. 1000milliLiter(s) IV Continuous <Continuous>  latanoprost 0.005% Ophthalmic Solution 1Drop(s) Left EYE at bedtime          PHYSICAL EXAM:  T(C): 36.3, Max: 37.2 (04-02 @ 09:31)  HR: 94 (79 - 94)  BP: 148/66 (109/56 - 157/77)  RR: 16 (16 - 16)  SpO2: 95% (95% - 96%)  Wt(kg): --  I&O's Summary    I & Os for current day (as of 03 Apr 2017 08:10)  =============================================  IN: 0 ml / OUT: 1950 ml / NET: -1950 ml        Appearance: Normal	  HEENT:   Normal oral mucosa, PERRL, EOMI	  Lymphatic: No lymphadenopathy  Cardiovascular: Normal S1 S2, No JVD, No murmurs,   Respiratory: Lungs scattered ronchi/wheeze	  Psychiatry: A & O x 3, Mood & affect appropriate  Gastrointestinal:  Soft, Non-tender, + BS	  Skin: No rashes, No ecchymoses, No cyanosis  Neurologic:  No clubbing, cyanosis, 2+ bilateral leg edema(L>R)edema      TELEMETRY: 	    ECG:  NSR, ST abn	  RADIOLOGY:   DIAGNOSTIC TESTING:  [ ] Echocardiogram:  [ ]  Catheterization:  [ ] Stress Test:    OTHER: 	    LABS:	 	    CARDIAC MARKERS:                                  8.0    9.2   )-----------( 192      ( 03 Apr 2017 07:44 )             24.8     01 Apr 2017 21:09    131    |  96     |  14     ----------------------------<  156    4.0     |  25     |  0.71     Ca    8.1        01 Apr 2017 21:09    TPro  6.1    /  Alb  3.1    /  TBili  0.3    /  DBili  x      /  AST  31     /  ALT  26     /  AlkPhos  91     01 Apr 2017 21:09    proBNP:   Lipid Profile:   HgA1c:   TSH:     ASSESSMENT/PLAN: 	  ECHO reportedly with normal LV fx,no wma and no significant valvular dysfunction.discussed with Dr Grossman- CV status stable enough to permit orthopedic surgery- anemia? due to bleeding into fx site. needs pulmonary clearance

## 2017-04-04 LAB
ANION GAP SERPL CALC-SCNC: 9 MMOL/L — SIGNIFICANT CHANGE UP (ref 9–16)
BUN SERPL-MCNC: 18 MG/DL — SIGNIFICANT CHANGE UP (ref 7–23)
CALCIUM SERPL-MCNC: 7.9 MG/DL — LOW (ref 8.5–10.5)
CHLORIDE SERPL-SCNC: 99 MMOL/L — SIGNIFICANT CHANGE UP (ref 96–108)
CO2 SERPL-SCNC: 28 MMOL/L — SIGNIFICANT CHANGE UP (ref 22–31)
CREAT SERPL-MCNC: 0.6 MG/DL — SIGNIFICANT CHANGE UP (ref 0.5–1.3)
CULTURE RESULTS: NO GROWTH — SIGNIFICANT CHANGE UP
GLUCOSE SERPL-MCNC: 152 MG/DL — HIGH (ref 70–99)
HCT VFR BLD CALC: 28.1 % — LOW (ref 34.5–45)
HGB BLD-MCNC: 9.3 G/DL — LOW (ref 11.5–15.5)
MCHC RBC-ENTMCNC: 27.2 PG — SIGNIFICANT CHANGE UP (ref 27–34)
MCHC RBC-ENTMCNC: 33.1 G/DL — SIGNIFICANT CHANGE UP (ref 32–36)
MCV RBC AUTO: 82.2 FL — SIGNIFICANT CHANGE UP (ref 80–100)
PLATELET # BLD AUTO: 235 K/UL — SIGNIFICANT CHANGE UP (ref 150–400)
POTASSIUM SERPL-MCNC: 3.9 MMOL/L — SIGNIFICANT CHANGE UP (ref 3.5–5.3)
POTASSIUM SERPL-SCNC: 3.9 MMOL/L — SIGNIFICANT CHANGE UP (ref 3.5–5.3)
RBC # BLD: 3.42 M/UL — LOW (ref 3.8–5.2)
RBC # FLD: 16.1 % — SIGNIFICANT CHANGE UP (ref 10.3–16.9)
SODIUM SERPL-SCNC: 136 MMOL/L — SIGNIFICANT CHANGE UP (ref 135–145)
SPECIMEN SOURCE: SIGNIFICANT CHANGE UP
WBC # BLD: 11.1 K/UL — HIGH (ref 3.8–10.5)
WBC # FLD AUTO: 11.1 K/UL — HIGH (ref 3.8–10.5)

## 2017-04-04 PROCEDURE — 99233 SBSQ HOSP IP/OBS HIGH 50: CPT

## 2017-04-04 PROCEDURE — 99232 SBSQ HOSP IP/OBS MODERATE 35: CPT | Mod: GC

## 2017-04-04 RX ORDER — KETOROLAC TROMETHAMINE 30 MG/ML
15 SYRINGE (ML) INJECTION EVERY 6 HOURS
Qty: 0 | Refills: 0 | Status: COMPLETED | OUTPATIENT
Start: 2017-04-04 | End: 2017-04-06

## 2017-04-04 RX ORDER — OXYCODONE HYDROCHLORIDE 5 MG/1
1 TABLET ORAL
Qty: 0 | Refills: 0 | COMMUNITY
Start: 2017-04-04

## 2017-04-04 RX ORDER — LABETALOL HCL 100 MG
100 TABLET ORAL ONCE
Qty: 0 | Refills: 0 | Status: COMPLETED | OUTPATIENT
Start: 2017-04-04 | End: 2017-04-05

## 2017-04-04 RX ORDER — IPRATROPIUM/ALBUTEROL SULFATE 18-103MCG
3 AEROSOL WITH ADAPTER (GRAM) INHALATION
Qty: 0 | Refills: 0 | COMMUNITY
Start: 2017-04-04

## 2017-04-04 RX ORDER — CELECOXIB 200 MG/1
200 CAPSULE ORAL DAILY
Qty: 0 | Refills: 0 | Status: DISCONTINUED | OUTPATIENT
Start: 2017-04-04 | End: 2017-04-05

## 2017-04-04 RX ORDER — ACETAMINOPHEN 500 MG
2 TABLET ORAL
Qty: 0 | Refills: 0 | COMMUNITY
Start: 2017-04-04

## 2017-04-04 RX ORDER — ENOXAPARIN SODIUM 100 MG/ML
30 INJECTION SUBCUTANEOUS
Qty: 0 | Refills: 0 | COMMUNITY
Start: 2017-04-04

## 2017-04-04 RX ORDER — ACETAMINOPHEN 500 MG
1000 TABLET ORAL ONCE
Qty: 0 | Refills: 0 | Status: COMPLETED | OUTPATIENT
Start: 2017-04-04 | End: 2017-04-04

## 2017-04-04 RX ADMIN — Medication 40 MILLIGRAM(S): at 21:00

## 2017-04-04 RX ADMIN — OXYCODONE HYDROCHLORIDE 10 MILLIGRAM(S): 5 TABLET ORAL at 12:01

## 2017-04-04 RX ADMIN — Medication 100 MILLIGRAM(S): at 00:12

## 2017-04-04 RX ADMIN — Medication 20 MILLIGRAM(S): at 14:28

## 2017-04-04 RX ADMIN — ENOXAPARIN SODIUM 30 MILLIGRAM(S): 100 INJECTION SUBCUTANEOUS at 18:44

## 2017-04-04 RX ADMIN — Medication 100 MILLIGRAM(S): at 05:07

## 2017-04-04 RX ADMIN — Medication 400 MILLIGRAM(S): at 09:21

## 2017-04-04 RX ADMIN — OXYCODONE HYDROCHLORIDE 5 MILLIGRAM(S): 5 TABLET ORAL at 05:07

## 2017-04-04 RX ADMIN — Medication 3 MILLILITER(S): at 18:44

## 2017-04-04 RX ADMIN — Medication 20 MILLIGRAM(S): at 00:11

## 2017-04-04 RX ADMIN — Medication 15 MILLIGRAM(S): at 18:44

## 2017-04-04 RX ADMIN — Medication 20 MILLIGRAM(S): at 23:44

## 2017-04-04 RX ADMIN — Medication 100 MILLIGRAM(S): at 07:49

## 2017-04-04 RX ADMIN — OXYCODONE HYDROCHLORIDE 10 MILLIGRAM(S): 5 TABLET ORAL at 23:45

## 2017-04-04 RX ADMIN — ENOXAPARIN SODIUM 30 MILLIGRAM(S): 100 INJECTION SUBCUTANEOUS at 07:16

## 2017-04-04 RX ADMIN — OXYCODONE HYDROCHLORIDE 10 MILLIGRAM(S): 5 TABLET ORAL at 11:31

## 2017-04-04 RX ADMIN — Medication 100 MILLIGRAM(S): at 14:28

## 2017-04-04 RX ADMIN — OXYCODONE HYDROCHLORIDE 5 MILLIGRAM(S): 5 TABLET ORAL at 06:00

## 2017-04-04 RX ADMIN — OXYCODONE HYDROCHLORIDE 10 MILLIGRAM(S): 5 TABLET ORAL at 23:00

## 2017-04-04 RX ADMIN — LATANOPROST 1 DROP(S): 0.05 SOLUTION/ DROPS OPHTHALMIC; TOPICAL at 21:00

## 2017-04-04 RX ADMIN — Medication 3 MILLILITER(S): at 23:44

## 2017-04-04 RX ADMIN — Medication 15 MILLIGRAM(S): at 23:44

## 2017-04-04 RX ADMIN — ZOLPIDEM TARTRATE 5 MILLIGRAM(S): 10 TABLET ORAL at 00:34

## 2017-04-04 RX ADMIN — SERTRALINE 100 MILLIGRAM(S): 25 TABLET, FILM COATED ORAL at 14:28

## 2017-04-04 RX ADMIN — Medication 100 MILLIGRAM(S): at 00:11

## 2017-04-04 RX ADMIN — Medication 1000 MILLIGRAM(S): at 09:21

## 2017-04-04 RX ADMIN — Medication 100 MILLIGRAM(S): at 21:00

## 2017-04-04 RX ADMIN — Medication 20 MILLIGRAM(S): at 21:01

## 2017-04-04 RX ADMIN — GABAPENTIN 300 MILLIGRAM(S): 400 CAPSULE ORAL at 14:28

## 2017-04-04 RX ADMIN — Medication 40 MILLIGRAM(S): at 09:21

## 2017-04-04 RX ADMIN — Medication 3 MILLILITER(S): at 14:29

## 2017-04-04 RX ADMIN — Medication 3 MILLILITER(S): at 05:07

## 2017-04-04 NOTE — DISCHARGE NOTE ADULT - NS AS ACTIVITY OBS
Showering allowed/Do not make important decisions/Do not drive or operate machinery/No Heavy lifting/straining

## 2017-04-04 NOTE — PROGRESS NOTE ADULT - SUBJECTIVE AND OBJECTIVE BOX
ORTHO NOTE    [x ] Pt seen/examined at 8:15am  [ ] Pt without any complaints/in NAD.    [x ] Pt complains of: incisional pain right hip      ROS: [ ] Fever  [ ] Chills  [ ] CP [ ] SOB [ ] Dysnea  [ ] Palpitations [ ] Cough [ ] N/V/C/D [ ] Paresthia [ ] Other     [x ROS  otherwise negative    .    PHYSICAL EXAM:    Vital Signs Last 24 Hrs  T(C): 36.9, Max: 37.2 (04-03 @ 15:15)  T(F): 98.4, Max: 98.9 (04-03 @ 15:15)  HR: 94 (76 - 115)  BP: 142/65 (126/70 - 193/79)  BP(mean): --  RR: 17 (12 - 27)  SpO2: 100% (96% - 100%)    I&O's Detail  I & Os for 24h ending 04 Apr 2017 07:00  =============================================  IN:    sodium chloride 0.9%.: 175 ml    IV PiggyBack: 50 ml    Total IN: 225 ml  ---------------------------------------------  OUT:    Voided: 1800 ml    Indwelling Catheter - Urethral: 575 ml    Total OUT: 2375 ml  ---------------------------------------------  Total NET: -2150 ml    I & Os for current day (as of 04 Apr 2017 14:25)  =============================================  IN:    Oral Fluid: 240 ml    Total IN: 240 ml  ---------------------------------------------  OUT:    Indwelling Catheter - Urethral: 250 ml    Total OUT: 250 ml  ---------------------------------------------  Total NET: -10 ml       CAPILLARY BLOOD GLUCOSE                  Neuro: AAOx3    Lungs: +rhonchi, IS demonstrated, continue nebulizers and solumedrol per pulmonary    CV: tele    ABD: soft, nontender, bowel regimen    Ext: right hip prevena plus to suction, R LE NVID, ROM limited by pain    LABS                        9.3    11.1  )-----------( 235      ( 04 Apr 2017 11:35 )             28.1                                04 Apr 2017 11:35    136    |  99     |  18     ----------------------------<  152    3.9     |  28     |  0.60     Ca    7.9        04 Apr 2017 11:35        [ ] Other Labs  [ ] None ordered            Please check or [place] when present:  •  Heart Failure:    [ ] Acute        [ ]  Acute on Chronic        [ ] Chronic         [ ] Diastolic     [ ]  Combined    •  DREW:     [ ] ATN        [ ]  Renal medullary necrosis       [ ]  Renal cortical necrosis                  [ ] Other pathological Lesion:  •  CKD:  [ ] Stage I   [ ] Stage II  [ ] Stage III    [ ]Stage IV   [ ]  CKD V   [ ]  Other/Unspecified:    •  Abdominal Nutritional Status:   [ ] Malnutrition-See Nutrition note    [ ] Cachexia   [ ]  Other        [ ] Supplement ordered:            [ ] Morbid Obesity: BMI >=40         ASSESSMENT/PLAN:      STATUS POST: Right hip IM Nail    CONTINUE:          [ ] PT- WBAT    [ ] DVT PPX- lovenox 30mg bid    [ ] Pain Mgt- 1g IV tylenol, fentanyl patch, prn oxycodone    [ ] Dispo plan- [facility] ORTHO NOTE    [x ] Pt seen/examined at 8:15am  [ ] Pt without any complaints/in NAD.    [x ] Pt complains of: incisional pain right hip      ROS: [ ] Fever  [ ] Chills  [ ] CP [ ] SOB [ ] Dysnea  [ ] Palpitations [ ] Cough [ ] N/V/C/D [ ] Paresthia [ ] Other     [x ROS  otherwise negative    .    PHYSICAL EXAM:    Vital Signs Last 24 Hrs  T(C): 36.9, Max: 37.2 (04-03 @ 15:15)  T(F): 98.4, Max: 98.9 (04-03 @ 15:15)  HR: 94 (76 - 115)  BP: 142/65 (126/70 - 193/79)  BP(mean): --  RR: 17 (12 - 27)  SpO2: 100% (96% - 100%)    I&O's Detail  I & Os for 24h ending 04 Apr 2017 07:00  =============================================  IN:    sodium chloride 0.9%.: 175 ml    IV PiggyBack: 50 ml    Total IN: 225 ml  ---------------------------------------------  OUT:    Voided: 1800 ml    Indwelling Catheter - Urethral: 575 ml    Total OUT: 2375 ml  ---------------------------------------------  Total NET: -2150 ml    I & Os for current day (as of 04 Apr 2017 14:25)  =============================================  IN:    Oral Fluid: 240 ml    Total IN: 240 ml  ---------------------------------------------  OUT:    Indwelling Catheter - Urethral: 250 ml    Total OUT: 250 ml  ---------------------------------------------  Total NET: -10 ml       CAPILLARY BLOOD GLUCOSE                  Neuro: AAOx3    Lungs: +rhonchi, +wheezing IS demonstrated, continue nebulizers and solumedrol per pulmonary    CV: tele    ABD: soft, nontender, bowel regimen    Ext: right hip prevena plus to suction, R LE NVID, ROM limited by pain    LABS                        9.3    11.1  )-----------( 235      ( 04 Apr 2017 11:35 )             28.1                                04 Apr 2017 11:35    136    |  99     |  18     ----------------------------<  152    3.9     |  28     |  0.60     Ca    7.9        04 Apr 2017 11:35        [ ] Other Labs  [ ] None ordered            Please check or [place] when present:  •  Heart Failure:    [ ] Acute        [ ]  Acute on Chronic        [ ] Chronic         [ ] Diastolic     [ ]  Combined    •  DREW:     [ ] ATN        [ ]  Renal medullary necrosis       [ ]  Renal cortical necrosis                  [ ] Other pathological Lesion:  •  CKD:  [ ] Stage I   [ ] Stage II  [ ] Stage III    [ ]Stage IV   [ ]  CKD V   [ ]  Other/Unspecified:    •  Abdominal Nutritional Status:   [ ] Malnutrition-See Nutrition note    [ ] Cachexia   [ ]  Other        [ ] Supplement ordered:            [ ] Morbid Obesity: BMI >=40         ASSESSMENT/PLAN:      STATUS POST: Right hip IM Nail    CONTINUE:          [ ] PT- WBAT    [ ] DVT PPX- lovenox 30mg bid    [ ] Pain Mgt- 1g IV tylenol, fentanyl patch, prn oxycodone    [ ] Dispo plan- [facility]

## 2017-04-04 NOTE — PROGRESS NOTE ADULT - ASSESSMENT
86y Female s/p R femur IMN, stable    - analgesia with bowel regimen  - WBAT with PT  - OOB  - DVT ppx - lovenox 30 BID  - ADAT  - f/u labs  - Dispo planning

## 2017-04-04 NOTE — DISCHARGE NOTE ADULT - MEDICATION SUMMARY - MEDICATIONS TO TAKE
I will START or STAY ON the medications listed below when I get home from the hospital:    fentaNYL 12 mcg/hr transdermal film, extended release  -- 1 patch by transdermal patch every 72 hours  -- Indication: For Pain    oxyCODONE 10 mg oral tablet  -- 1 tab(s) by mouth every 4 hours, As needed, Severe Pain (7 - 10)  -- Indication: For Pain    oxyCODONE 5 mg oral tablet  -- 1 tab(s) by mouth every 4 hours, As needed, Moderate Pain (4 - 6)  -- Indication: For Pain    acetaminophen 325 mg oral tablet  -- 2 tab(s) by mouth every 6 hours, As needed, Mild Pain (1 - 3)  -- Indication: For Pain    enoxaparin  -- 30 milligram(s) subcutaneous every 12 hours. Take for 14 days after surgery to prevent blood clots  -- Indication: For DVT ppx    gabapentin 300 mg oral tablet  -- 300 milligram(s) by mouth once a day  -- Indication: For Pain    Zoloft 100 mg oral tablet  -- 1 tab(s) by mouth once a day  -- Indication: For Home med    traZODone  -- 110 milligram(s) by mouth once a day (at bedtime)  -- Indication: For Home med    Ambien 5 mg oral tablet  -- 1 tab(s) by mouth once a day (at bedtime)  -- Indication: For Home med    Trandate 100 mg oral tablet  -- 1 tab(s) by mouth 2 times a day  -- Indication: For HTN    albuterol-ipratropium 2.5 mg-0.5 mg/3 mL inhalation solution  -- 3 milliliter(s) inhaled every 6 hours  -- Indication: For Home med    Lasix 40 mg oral tablet  -- 1 tab(s) by mouth once a day  -- Indication: For Home med    Dulcolax Stool Softener 100 mg oral capsule  --  by mouth   -- Indication: For constipation    bisacodyl 5 mg oral delayed release tablet  -- 1 tab(s) by mouth every 12 hours, as needed, constipation  -- Indication: For constipation    bisacodyl 10 mg rectal suppository  -- 1 suppository(ies) rectally once a day (at bedtime), As needed, If no bowel movement by POD#2  -- Indication: For constipation

## 2017-04-04 NOTE — DISCHARGE NOTE ADULT - CARE PROVIDERS DIRECT ADDRESSES
,DirectAddress_Unknown,[email or web address]

## 2017-04-04 NOTE — PHYSICAL THERAPY INITIAL EVALUATION ADULT - PERTINENT HX OF CURRENT PROBLEM, REHAB EVAL
From H&P: "85yo F w/ pmhx of glaucoma, constipation presents after fall on right hip and subsequent R hip IT fracture." From H&P: "85yo F w/ pmhx of glaucoma, constipation presents after fall on right hip and subsequent R hip IT fracture." Patient is s/p R IMN POD1. From H&P: "85yo F w/ pmhx of glaucoma, constipation presents after fall on right hip and subsequent R hip IT fracture." Patient is s/p R hip IMN POD1.

## 2017-04-04 NOTE — PROGRESS NOTE ADULT - SUBJECTIVE AND OBJECTIVE BOX
INTERVAL HISTORY:  	s/p surgery. no c/o cp/sob      MEDICATIONS:  furosemide   Injectable 20milliGRAM(s) IV Push every 12 hours    ceFAZolin   IVPB 2000milliGRAM(s) IV Intermittent every 8 hours    ALBUTerol    90 MICROgram(s) HFA Inhaler 1Puff(s) Inhalation every 4 hours  ALBUTerol/ipratropium for Nebulization 3milliLiter(s) Nebulizer every 6 hours    fentaNYL   Patch  12 MICROgram(s)/Hr 1Patch Transdermal every 72 hours  gabapentin 300milliGRAM(s) Oral daily  traZODone 100milliGRAM(s) Oral at bedtime  sertraline 100milliGRAM(s) Oral daily  acetaminophen   Tablet 650milliGRAM(s) Oral every 6 hours PRN  acetaminophen   Tablet. 650milliGRAM(s) Oral every 6 hours PRN  oxyCODONE IR 10milliGRAM(s) Oral every 4 hours PRN  oxyCODONE IR 5milliGRAM(s) Oral every 4 hours PRN  morphine  - Injectable 4milliGRAM(s) IV Push every 4 hours PRN  zolpidem 5milliGRAM(s) Oral at bedtime PRN    docusate sodium 100milliGRAM(s) Oral three times a day  magnesium hydroxide Suspension 30milliLiter(s) Oral two times a day PRN  bisacodyl Suppository 10milliGRAM(s) Rectal at bedtime PRN  bisacodyl 20milliGRAM(s) Oral at bedtime    methylPREDNISolone sodium succinate Injectable 40milliGRAM(s) IV Push every 12 hours    sodium chloride 0.9%. 1000milliLiter(s) IV Continuous <Continuous>  latanoprost 0.005% Ophthalmic Solution 1Drop(s) Left EYE at bedtime  enoxaparin Injectable 30milliGRAM(s) SubCutaneous two times a day          PHYSICAL EXAM:  T(C): 36.5, Max: 37.4 (04-03 @ 09:30)  HR: 88bpm  BP: 150/70 (126/70 - 193/79)  RR: 18 (12 - 27)  SpO2: 100% (94% - 100%)  Wt(kg): --  I&O's Summary    I & Os for current day (as of 04 Apr 2017 07:30)  =============================================  IN: 225 ml / OUT: 2375 ml / NET: -2150 ml        Appearance: Normal	  HEENT:   Normal oral mucosa, PERRL, EOMI	  Lymphatic: No lymphadenopathy  Cardiovascular: Normal S1 S2, No JVD, No murmurs,  Respiratory: Lungs clear to auscultation	  Psychiatry: A & O x 3, Mood & affect appropriate  Gastrointestinal:  Soft, Non-tender,  Skin: No rashes, No ecchymoses, No cyanosis  Neurologic: Non-focal  Extremities: No clubbing, cyanosis   Vascular: Peripheral pulses palpable 2+ bilaterally    TELEMETRY:NSR 	    ECG:  	  RADIOLOGY:   DIAGNOSTIC TESTING:  [ ] Echocardiogram:  [ ]  Catheterization:  [ ] Stress Test:    OTHER: 	    LABS:	 	    CARDIAC MARKERS:                                  10.5   15.0  )-----------( 256      ( 03 Apr 2017 19:50 )             30.7     03 Apr 2017 07:44    135    |  98     |  18     ----------------------------<  162    3.4     |  29     |  0.65     Ca    8.0        03 Apr 2017 07:44      proBNP:   Lipid Profile:   HgA1c:   TSH:     ASSESSMENT/PLAN: 	  S/P surgery- in nSR- Cv status . echo yesterday- normal lV fx  . would correct K

## 2017-04-04 NOTE — PHYSICAL THERAPY INITIAL EVALUATION ADULT - PASSIVE RANGE OF MOTION EXAMINATION, REHAB EVAL
Left LE Passive ROM was WFL (within functional limits)/right hip limited by pain; unable to tolrate upright sitting (unable to achieve 90 hip flexion)

## 2017-04-04 NOTE — DISCHARGE NOTE ADULT - VISION (WITH CORRECTIVE LENSES IF THE PATIENT USUALLY WEARS THEM):
blind in the right eye for 30 years/Partially impaired: cannot see medication labels or newsprint, but can see obstacles in path, and the surrounding layout; can count fingers at arm's length

## 2017-04-04 NOTE — DISCHARGE NOTE ADULT - HOSPITAL COURSE
Admit 4/1/17  OR 4/3/17- Right hip IM Nail  Periop Antibx  DVT ppx  PT   Pain mgt  Pulm c/s  Cards c/s

## 2017-04-04 NOTE — DISCHARGE NOTE ADULT - PATIENT PORTAL LINK FT
“You can access the [employer] Patient Portal, offered by [facility], by registering with the following website: [email or web address]”

## 2017-04-04 NOTE — PROGRESS NOTE ADULT - SUBJECTIVE AND OBJECTIVE BOX
Patient seen and examined at bedside.    No acute events.  Pain tolerable.     Denies chest pain/SOB.  No headache.  Tolerating PO.    T(C): 36.5, Max: 37.4 (04-03 @ 09:30)  T(F): 97.7, Max: 99.3 (04-03 @ 09:30)  HR:  (76 - 115)  BP:  (126/70 - 193/79)  RR:  (12 - 27)  SpO2:  (94% - 100%)  Wt(kg): --    NAD, non labored respirations.  Abd soft, NTND.  dressing CDI, provena in tact  NVID   wwp and SILT                              10.5<L>  15.0<H> )-------------------( 256      ( 04-03 @ 19:50 )                 30.7<L>      I&O's Detail  I & Os for 24h ending 03 Apr 2017 07:00  =============================================  IN:    Total IN: 0 ml  ---------------------------------------------  OUT:    Indwelling Catheter - Urethral: 1950 ml    Total OUT: 1950 ml  ---------------------------------------------  Total NET: -1950 ml    I & Os for current day (as of 04 Apr 2017 06:35)  =============================================  IN:    sodium chloride 0.9%.: 175 ml    IV PiggyBack: 50 ml    Total IN: 225 ml  ---------------------------------------------  OUT:    Voided: 1800 ml    Indwelling Catheter - Urethral: 575 ml    Total OUT: 2375 ml  ---------------------------------------------  Total NET: -2150 ml

## 2017-04-04 NOTE — DISCHARGE NOTE ADULT - CARE PROVIDER_API CALL
Winnie Grossman), Critical Care Medicine; Pulmonary Disease  [address] [place] [address]  Phone: [phone]  Fax: [phone]

## 2017-04-04 NOTE — PROGRESS NOTE ADULT - SUBJECTIVE AND OBJECTIVE BOX
Interval Events: reviewed  Patient seen and examined at bedside.    Patient is a 86y old  Female who presents with a chief complaint of R hip pain post fall (04 Apr 2017 14:29)  she is better and pain is better.  She is not wheezing    PAST MEDICAL & SURGICAL HISTORY:  Glaucoma  No significant past surgical history      MEDICATIONS:  Pulmonary:  ALBUTerol    90 MICROgram(s) HFA Inhaler 1Puff(s) Inhalation every 4 hours  ALBUTerol/ipratropium for Nebulization 3milliLiter(s) Nebulizer every 6 hours    Antimicrobials:    Anticoagulants:  enoxaparin Injectable 30milliGRAM(s) SubCutaneous two times a day    Cardiac:  furosemide   Injectable 20milliGRAM(s) IV Push every 12 hours      Allergies    chocolate (Unknown)  No Known Drug Allergies    Intolerances        Vital Signs Last 24 Hrs  T(C): 36.8, Max: 36.9 (04-04 @ 09:26)  T(F): 98.3, Max: 98.5 (04-04 @ 09:26)  HR: 89 (85 - 115)  BP: 144/97 (135/61 - 150/70)  BP(mean): --  RR: 18 (17 - 20)  SpO2: 95% (95% - 100%)  I & Os for 24h ending 04-04 @ 07:00  =============================================  IN: 225 ml / OUT: 2375 ml / NET: -2150 ml    I & Os for current day (as of 04-04 @ 23:16)  =============================================  IN: 240 ml / OUT: 1250 ml / NET: -1010 ml        LABS:      CBC Full  -  ( 04 Apr 2017 11:35 )  WBC Count : 11.1 K/uL  Hemoglobin : 9.3 g/dL  Hematocrit : 28.1 %  Platelet Count - Automated : 235 K/uL  Mean Cell Volume : 82.2 fL  Mean Cell Hemoglobin : 27.2 pg  Mean Cell Hemoglobin Concentration : 33.1 g/dL  Auto Neutrophil # : x  Auto Lymphocyte # : x  Auto Monocyte # : x  Auto Eosinophil # : x  Auto Basophil # : x  Auto Neutrophil % : x  Auto Lymphocyte % : x  Auto Monocyte % : x  Auto Eosinophil % : x  Auto Basophil % : x    04 Apr 2017 11:35    136    |  99     |  18     ----------------------------<  152    3.9     |  28     |  0.60     Ca    7.9        04 Apr 2017 11:35                          RADIOLOGY & ADDITIONAL STUDIES (The following images were personally reviewed):  Crouch:                            Yes         Urine output:               Yes          DVT prophylaxis:         Yes          Flattus:                          Yes          Bowel movement:       Yes

## 2017-04-04 NOTE — DISCHARGE NOTE ADULT - PLAN OF CARE
Improved ambulation and decreased pain after right hip intramedullary nail 4/3/17 Weight bearing as tolerated on right leg with assistive device (rolling walker) and assistance.  Keep prevena incisional drain on for 7 days.  Then remove and keep incision open to air.  No strenuous activity, heavy lifting, driving, tub bathing, or returning to work until cleared by MD.  You may shower--dressing is waterproof.  Remove dressing after post op day 7, then leave incision open to air.  Follow up with Dr. Escalante to schedule an appt within 10-14 days.  If you don't have a bowel movement by post op day 3, then take Milk of Magnesia (over the counter).  If no bowel movement by at least post op day 5, then use a Dulcolax suppository (over the counter) and/or a Fleets enema--if still no bowel movement, call your MD.  Contact your doctor if you experience: fever greater than 101.5, chills, chest pain, difficulty breathing, bleeding, redness or heat around the incision.

## 2017-04-04 NOTE — PHYSICAL THERAPY INITIAL EVALUATION ADULT - GENERAL OBSERVATIONS, REHAB EVAL
Patient received supine, +SCDs B, +nasal cannula, +telemetry, +prevena wound vac, +call bell Patient received supine, +SCDs B, +nasal cannula, +telemetry, +prevena wound vac, +tripathi, +call bell

## 2017-04-04 NOTE — DISCHARGE NOTE ADULT - CARE PLAN
Principal Discharge DX:	Hip fracture  Goal:	Improved ambulation and decreased pain after right hip intramedullary nail 4/3/17  Instructions for follow-up, activity and diet:	Weight bearing as tolerated on right leg with assistive device (rolling walker) and assistance.  Keep prevena incisional drain on for 7 days.  Then remove and keep incision open to air.  No strenuous activity, heavy lifting, driving, tub bathing, or returning to work until cleared by MD.  You may shower--dressing is waterproof.  Remove dressing after post op day 7, then leave incision open to air.  Follow up with Dr. Escalante to schedule an appt within 10-14 days.  If you don't have a bowel movement by post op day 3, then take Milk of Magnesia (over the counter).  If no bowel movement by at least post op day 5, then use a Dulcolax suppository (over the counter) and/or a Fleets enema--if still no bowel movement, call your MD.  Contact your doctor if you experience: fever greater than 101.5, chills, chest pain, difficulty breathing, bleeding, redness or heat around the incision.

## 2017-04-04 NOTE — PHYSICAL THERAPY INITIAL EVALUATION ADULT - CRITERIA FOR SKILLED THERAPEUTIC INTERVENTIONS
impairments found/functional limitations in following categories/therapy frequency/anticipated discharge recommendation/rehab potential

## 2017-04-05 VITALS
TEMPERATURE: 97 F | RESPIRATION RATE: 17 BRPM | DIASTOLIC BLOOD PRESSURE: 68 MMHG | OXYGEN SATURATION: 98 % | SYSTOLIC BLOOD PRESSURE: 144 MMHG | HEART RATE: 82 BPM

## 2017-04-05 PROCEDURE — 71045 X-RAY EXAM CHEST 1 VIEW: CPT

## 2017-04-05 PROCEDURE — 51702 INSERT TEMP BLADDER CATH: CPT

## 2017-04-05 PROCEDURE — 86923 COMPATIBILITY TEST ELECTRIC: CPT

## 2017-04-05 PROCEDURE — 80048 BASIC METABOLIC PNL TOTAL CA: CPT

## 2017-04-05 PROCEDURE — 76000 FLUOROSCOPY <1 HR PHYS/QHP: CPT

## 2017-04-05 PROCEDURE — 99232 SBSQ HOSP IP/OBS MODERATE 35: CPT | Mod: GC

## 2017-04-05 PROCEDURE — 96374 THER/PROPH/DIAG INJ IV PUSH: CPT | Mod: XU

## 2017-04-05 PROCEDURE — C1713: CPT

## 2017-04-05 PROCEDURE — 87641 MR-STAPH DNA AMP PROBE: CPT

## 2017-04-05 PROCEDURE — 86901 BLOOD TYPING SEROLOGIC RH(D): CPT

## 2017-04-05 PROCEDURE — 80053 COMPREHEN METABOLIC PANEL: CPT

## 2017-04-05 PROCEDURE — 85027 COMPLETE CBC AUTOMATED: CPT

## 2017-04-05 PROCEDURE — 73552 X-RAY EXAM OF FEMUR 2/>: CPT

## 2017-04-05 PROCEDURE — 87086 URINE CULTURE/COLONY COUNT: CPT

## 2017-04-05 PROCEDURE — 93306 TTE W/DOPPLER COMPLETE: CPT

## 2017-04-05 PROCEDURE — 99285 EMERGENCY DEPT VISIT HI MDM: CPT | Mod: 25

## 2017-04-05 PROCEDURE — 36415 COLL VENOUS BLD VENIPUNCTURE: CPT

## 2017-04-05 PROCEDURE — 73502 X-RAY EXAM HIP UNI 2-3 VIEWS: CPT

## 2017-04-05 PROCEDURE — 36430 TRANSFUSION BLD/BLD COMPNT: CPT

## 2017-04-05 PROCEDURE — 86850 RBC ANTIBODY SCREEN: CPT

## 2017-04-05 PROCEDURE — 85025 COMPLETE CBC W/AUTO DIFF WBC: CPT

## 2017-04-05 PROCEDURE — 97161 PT EVAL LOW COMPLEX 20 MIN: CPT

## 2017-04-05 PROCEDURE — 81003 URINALYSIS AUTO W/O SCOPE: CPT

## 2017-04-05 PROCEDURE — 86900 BLOOD TYPING SEROLOGIC ABO: CPT

## 2017-04-05 PROCEDURE — 99233 SBSQ HOSP IP/OBS HIGH 50: CPT

## 2017-04-05 PROCEDURE — C1769: CPT

## 2017-04-05 PROCEDURE — 94640 AIRWAY INHALATION TREATMENT: CPT

## 2017-04-05 PROCEDURE — P9016: CPT

## 2017-04-05 PROCEDURE — 85730 THROMBOPLASTIN TIME PARTIAL: CPT

## 2017-04-05 PROCEDURE — 85610 PROTHROMBIN TIME: CPT

## 2017-04-05 PROCEDURE — 93005 ELECTROCARDIOGRAM TRACING: CPT | Mod: XU

## 2017-04-05 RX ORDER — LABETALOL HCL 100 MG
1 TABLET ORAL
Qty: 0 | Refills: 0 | COMMUNITY
Start: 2017-04-05

## 2017-04-05 RX ORDER — LABETALOL HCL 100 MG
100 TABLET ORAL
Qty: 0 | Refills: 0 | Status: DISCONTINUED | OUTPATIENT
Start: 2017-04-05 | End: 2017-04-05

## 2017-04-05 RX ADMIN — SERTRALINE 100 MILLIGRAM(S): 25 TABLET, FILM COATED ORAL at 14:20

## 2017-04-05 RX ADMIN — ENOXAPARIN SODIUM 30 MILLIGRAM(S): 100 INJECTION SUBCUTANEOUS at 07:59

## 2017-04-05 RX ADMIN — Medication 20 MILLIGRAM(S): at 14:21

## 2017-04-05 RX ADMIN — OXYCODONE HYDROCHLORIDE 10 MILLIGRAM(S): 5 TABLET ORAL at 07:30

## 2017-04-05 RX ADMIN — CELECOXIB 200 MILLIGRAM(S): 200 CAPSULE ORAL at 14:21

## 2017-04-05 RX ADMIN — Medication 15 MILLIGRAM(S): at 00:01

## 2017-04-05 RX ADMIN — Medication 15 MILLIGRAM(S): at 06:41

## 2017-04-05 RX ADMIN — Medication 100 MILLIGRAM(S): at 14:20

## 2017-04-05 RX ADMIN — Medication 15 MILLIGRAM(S): at 14:21

## 2017-04-05 RX ADMIN — GABAPENTIN 300 MILLIGRAM(S): 400 CAPSULE ORAL at 14:20

## 2017-04-05 RX ADMIN — Medication 100 MILLIGRAM(S): at 06:41

## 2017-04-05 RX ADMIN — Medication 3 MILLILITER(S): at 06:41

## 2017-04-05 RX ADMIN — Medication 15 MILLIGRAM(S): at 06:55

## 2017-04-05 RX ADMIN — Medication 40 MILLIGRAM(S): at 11:46

## 2017-04-05 RX ADMIN — OXYCODONE HYDROCHLORIDE 10 MILLIGRAM(S): 5 TABLET ORAL at 06:41

## 2017-04-05 RX ADMIN — Medication 3 MILLILITER(S): at 14:20

## 2017-04-05 RX ADMIN — Medication 100 MILLIGRAM(S): at 00:13

## 2017-04-05 RX ADMIN — ZOLPIDEM TARTRATE 5 MILLIGRAM(S): 10 TABLET ORAL at 00:13

## 2017-04-05 RX ADMIN — Medication 100 MILLIGRAM(S): at 11:45

## 2017-04-05 NOTE — PROGRESS NOTE ADULT - PROBLEM SELECTOR PLAN 1
due to the trauma and there is no evidence of infectious process.  She is on systemic steroids now
due to the trauma and there is no evidence of infectious process.  She is on systemic steroids now
due to the trauma and there is no evidence of infectious process

## 2017-04-05 NOTE — PROGRESS NOTE ADULT - PROBLEM SELECTOR PLAN 5
continue inhalers and Can discontinue steroids at this point avoid any complications for systemic steroids.  . Patient is on labetalol [and to observe on labetalol which might need to be changed due to bronchospasm
continue inhalers and started the patient on systemic steroids and atrovent.  DC steroids tomorrow
continue inhalers and started the patient on systemic steroids and atrovent

## 2017-04-05 NOTE — PROGRESS NOTE ADULT - SUBJECTIVE AND OBJECTIVE BOX
Interval Events: reviewed  Patient seen and examined at bedside.    Patient is a 86y old  Female who presents with a chief complaint of R hip pain post fall (04 Apr 2017 14:29)  sheis feeling better and no wheezing    PAST MEDICAL & SURGICAL HISTORY:  Glaucoma  No significant past surgical history      MEDICATIONS:  Pulmonary:  ALBUTerol    90 MICROgram(s) HFA Inhaler 1Puff(s) Inhalation every 4 hours  ALBUTerol/ipratropium for Nebulization 3milliLiter(s) Nebulizer every 6 hours    Antimicrobials:    Anticoagulants:  enoxaparin Injectable 30milliGRAM(s) SubCutaneous two times a day    Cardiac:  furosemide   Injectable 20milliGRAM(s) IV Push every 12 hours  labetalol 100milliGRAM(s) Oral two times a day      Allergies    chocolate (Unknown)  No Known Drug Allergies    Intolerances        Vital Signs Last 24 Hrs  T(C): 36.2, Max: 36.6 (04-04 @ 23:57)  T(F): 97.2, Max: 97.8 (04-04 @ 23:57)  HR: 82 (82 - 100)  BP: 144/68 (144/68 - 188/75)  BP(mean): --  RR: 17 (17 - 17)  SpO2: 98% (97% - 98%)    I & Os for current day (as of 04-05 @ 21:37)  =============================================  IN: 240 ml / OUT: 2050 ml / NET: -1810 ml        LABS:      CBC Full  -  ( 04 Apr 2017 11:35 )  WBC Count : 11.1 K/uL  Hemoglobin : 9.3 g/dL  Hematocrit : 28.1 %  Platelet Count - Automated : 235 K/uL  Mean Cell Volume : 82.2 fL  Mean Cell Hemoglobin : 27.2 pg  Mean Cell Hemoglobin Concentration : 33.1 g/dL  Auto Neutrophil # : x  Auto Lymphocyte # : x  Auto Monocyte # : x  Auto Eosinophil # : x  Auto Basophil # : x  Auto Neutrophil % : x  Auto Lymphocyte % : x  Auto Monocyte % : x  Auto Eosinophil % : x  Auto Basophil % : x    04-04    136  |  99  |  18  ----------------------------<  152<H>  3.9   |  28  |  0.60    Ca    7.9<L>      04 Apr 2017 11:35                          RADIOLOGY & ADDITIONAL STUDIES (The following images were personally reviewed):  Crouch:                         No  Urine output:               Yes          DVT prophylaxis:         Yes          Flattus:                          Yes          Bowel movement:       No

## 2017-04-05 NOTE — PROGRESS NOTE ADULT - PROVIDER SPECIALTY LIST ADULT
Cardiology
Internal Medicine
Orthopedics

## 2017-04-05 NOTE — PROGRESS NOTE ADULT - SUBJECTIVE AND OBJECTIVE BOX
Patient seen and examined at bedside.      S: No acute events overnight.   Pain tolerable.    Denies CP/SOB. Tolerating PO.  ROS unremarkable.    Has not ambulated yet.    O: T(C): 36.4, Max: 36.9 (04-04 @ 09:26)  HR: 88 (85 - 100)  BP: 170/73 (135/61 - 188/75)  RR: 17 (17 - 20)  SpO2: 97% (93% - 100%)  Wt(kg): --    NAD, AOx3, non-labored respirations  Abd soft, NTND  Dressing CDI. Provena in tact  Extremity soft, appropriate swelling and minimally TTP  foot warm and well perfused  SILT dP, sP, Sa, Dick, T  firing TA/EHL/GS                          9.3    11.1  )-----------( 235      ( 04 Apr 2017 11:35 )             28.1       I&O's Detail  I & Os for 24h ending 04 Apr 2017 07:00  =============================================  IN:    sodium chloride 0.9%.: 175 ml    IV PiggyBack: 50 ml    Total IN: 225 ml  ---------------------------------------------  OUT:    Voided: 1800 ml    Indwelling Catheter - Urethral: 575 ml    Total OUT: 2375 ml  ---------------------------------------------  Total NET: -2150 ml    I & Os for current day (as of 05 Apr 2017 05:54)  =============================================  IN:    Oral Fluid: 240 ml    Total IN: 240 ml  ---------------------------------------------  OUT:    Indwelling Catheter - Urethral: 2050 ml    Total OUT: 2050 ml  ---------------------------------------------  Total NET: -1810 ml        86y Female s/p R femur IMN for IT fracture, stable    - analgesia with bowel regimen  - WBAT with PT  - OOB ad catrina  - DVT ppx - LVX 30mg BID  - ADAT  - f/u labs  - Dispo planning

## 2017-04-05 NOTE — PROGRESS NOTE ADULT - RESPIRATORY
Breath Sounds equal & clear to percussion & auscultation, no accessory muscle use
detailed exam
Breath Sounds equal & clear to percussion & auscultation, no accessory muscle use

## 2017-04-05 NOTE — PROGRESS NOTE ADULT - SUBJECTIVE AND OBJECTIVE BOX
INTERVAL HISTORY:  no c/o cp, sob.   	      MEDICATIONS:  furosemide   Injectable 20milliGRAM(s) IV Push every 12 hours      ALBUTerol    90 MICROgram(s) HFA Inhaler 1Puff(s) Inhalation every 4 hours  ALBUTerol/ipratropium for Nebulization 3milliLiter(s) Nebulizer every 6 hours    fentaNYL   Patch  12 MICROgram(s)/Hr 1Patch Transdermal every 72 hours  gabapentin 300milliGRAM(s) Oral daily  traZODone 100milliGRAM(s) Oral at bedtime  sertraline 100milliGRAM(s) Oral daily  acetaminophen   Tablet 650milliGRAM(s) Oral every 6 hours PRN  acetaminophen   Tablet. 650milliGRAM(s) Oral every 6 hours PRN  oxyCODONE IR 10milliGRAM(s) Oral every 4 hours PRN  oxyCODONE IR 5milliGRAM(s) Oral every 4 hours PRN  morphine  - Injectable 4milliGRAM(s) IV Push every 4 hours PRN  zolpidem 5milliGRAM(s) Oral at bedtime PRN  ketorolac   Injectable 15milliGRAM(s) IV Push every 6 hours  celecoxib 200milliGRAM(s) Oral daily    docusate sodium 100milliGRAM(s) Oral three times a day  magnesium hydroxide Suspension 30milliLiter(s) Oral two times a day PRN  bisacodyl Suppository 10milliGRAM(s) Rectal at bedtime PRN  bisacodyl 20milliGRAM(s) Oral at bedtime    methylPREDNISolone sodium succinate Injectable 40milliGRAM(s) IV Push every 12 hours    sodium chloride 0.9%. 1000milliLiter(s) IV Continuous <Continuous>  latanoprost 0.005% Ophthalmic Solution 1Drop(s) Left EYE at bedtime  enoxaparin Injectable 30milliGRAM(s) SubCutaneous two times a day          PHYSICAL EXAM:  T(C): 36.4, Max: 36.9 (04-04 @ 09:26)  HR: 88 (85 - 100)  BP: 170/73 (135/61 - 188/75)  RR: 17 (17 - 20)  SpO2: 97% (93% - 100%)  Wt(kg): --  I&O's Summary  I & Os for 24h ending 04 Apr 2017 07:00  =============================================  IN: 225 ml / OUT: 2375 ml / NET: -2150 ml    I & Os for current day (as of 05 Apr 2017 06:35)  =============================================  IN: 240 ml / OUT: 2050 ml / NET: -1810 ml        Appearance: Normal	  HEENT:   Normal oral mucosa, PERRL, EOMI	  Lymphatic: No lymphadenopathy  Cardiovascular: Normal S1 S2, No JVD, No murmurs,  Respiratory: Lungs clear to auscultation	  Psychiatry: A & O x 3,  Gastrointestinal:  Soft, Non-tender, + BS	  Skin: No rashes, No ecchymoses, No cyanosis  Neurologic: Non-focal  Extremities:  No clubbing, cyanosis or edema  Vascular: Peripheral pulses palpable 2+ bilaterally    TELEMETRY: 	  NSR with ST  ECG:  	  RADIOLOGY:   DIAGNOSTIC TESTING:  [ ] Echocardiogram:  [ ]  Catheterization:  [ ] Stress Test:    OTHER: 	    LABS:	 	    CARDIAC MARKERS:                                  9.3    11.1  )-----------( 235      ( 04 Apr 2017 11:35 )             28.1     04 Apr 2017 11:35    136    |  99     |  18     ----------------------------<  152    3.9     |  28     |  0.60     Ca    7.9        04 Apr 2017 11:35      proBNP:   Lipid Profile:   HgA1c:   TSH:     ASSESSMENT/PLAN: 	  no evidence of significant arrhythmias. BP running high, would add labetolol to regimen and adjust for HR/BP control

## 2017-04-05 NOTE — PROGRESS NOTE ADULT - NS NEC GEN PE MLT EXAM PC
No bruits; no thyromegaly or nodules

## 2017-04-05 NOTE — PROGRESS NOTE ADULT - ATTENDING COMMENTS
I discussed the case with bed board yesterday, cardiology, and ortho
The patient medically cleared for discharge.
I discussed the case with bed board yesterday, cardiology, and ortho

## 2017-04-10 ENCOUNTER — EMERGENCY (EMERGENCY)
Facility: HOSPITAL | Age: 82
LOS: 1 days | Discharge: HOME | End: 2017-04-10
Attending: EMERGENCY MEDICINE | Admitting: EMERGENCY MEDICINE
Payer: MEDICARE

## 2017-04-10 VITALS
OXYGEN SATURATION: 95 % | TEMPERATURE: 98 F | RESPIRATION RATE: 18 BRPM | HEART RATE: 82 BPM | SYSTOLIC BLOOD PRESSURE: 124 MMHG | DIASTOLIC BLOOD PRESSURE: 75 MMHG

## 2017-04-10 DIAGNOSIS — S72.141A DISPLACED INTERTROCHANTERIC FRACTURE OF RIGHT FEMUR, INITIAL ENCOUNTER FOR CLOSED FRACTURE: ICD-10-CM

## 2017-04-10 DIAGNOSIS — S72.001A FRACTURE OF UNSPECIFIED PART OF NECK OF RIGHT FEMUR, INITIAL ENCOUNTER FOR CLOSED FRACTURE: ICD-10-CM
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
## 2017-04-10 DIAGNOSIS — Z96.7 PRESENCE OF OTHER BONE AND TENDON IMPLANTS: Chronic | ICD-10-CM

## 2017-04-10 DIAGNOSIS — E66.01 MORBID (SEVERE) OBESITY DUE TO EXCESS CALORIES: ICD-10-CM

## 2017-04-10 DIAGNOSIS — D72.829 ELEVATED WHITE BLOOD CELL COUNT, UNSPECIFIED: ICD-10-CM

## 2017-04-10 DIAGNOSIS — I10 ESSENTIAL (PRIMARY) HYPERTENSION: ICD-10-CM

## 2017-04-10 DIAGNOSIS — Z91.018 ALLERGY TO OTHER FOODS: ICD-10-CM
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
## 2017-04-10 DIAGNOSIS — D72.828 OTHER ELEVATED WHITE BLOOD CELL COUNT: ICD-10-CM
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
## 2017-04-10 DIAGNOSIS — Z79.899 OTHER LONG TERM (CURRENT) DRUG THERAPY: ICD-10-CM

## 2017-04-10 DIAGNOSIS — J98.01 ACUTE BRONCHOSPASM: ICD-10-CM

## 2017-04-10 DIAGNOSIS — Z79.891 LONG TERM (CURRENT) USE OF OPIATE ANALGESIC: ICD-10-CM
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
## 2017-04-10 DIAGNOSIS — H40.9 UNSPECIFIED GLAUCOMA: ICD-10-CM

## 2017-04-10 DIAGNOSIS — E87.1 HYPO-OSMOLALITY AND HYPONATREMIA: ICD-10-CM

## 2017-04-10 DIAGNOSIS — Z91.81 HISTORY OF FALLING: ICD-10-CM

## 2017-04-10 DIAGNOSIS — R11.0 NAUSEA: ICD-10-CM

## 2017-04-10 DIAGNOSIS — R53.83 OTHER FATIGUE: ICD-10-CM

## 2017-04-10 LAB
ALBUMIN SERPL ELPH-MCNC: 2.3 G/DL — LOW (ref 3.4–5)
ALP SERPL-CCNC: 79 U/L — SIGNIFICANT CHANGE UP (ref 40–120)
ALT FLD-CCNC: 25 U/L — SIGNIFICANT CHANGE UP (ref 12–42)
ANION GAP SERPL CALC-SCNC: 9 MMOL/L — SIGNIFICANT CHANGE UP (ref 9–16)
APPEARANCE UR: CLEAR — SIGNIFICANT CHANGE UP
AST SERPL-CCNC: 24 U/L — SIGNIFICANT CHANGE UP (ref 15–37)
BASOPHILS NFR BLD AUTO: 0.1 % — SIGNIFICANT CHANGE UP (ref 0–2)
BILIRUB SERPL-MCNC: 0.9 MG/DL — SIGNIFICANT CHANGE UP (ref 0.2–1.2)
BILIRUB UR-MCNC: NEGATIVE — SIGNIFICANT CHANGE UP
BUN SERPL-MCNC: 25 MG/DL — HIGH (ref 7–23)
CALCIUM SERPL-MCNC: 7.9 MG/DL — LOW (ref 8.5–10.5)
CHLORIDE SERPL-SCNC: 92 MMOL/L — LOW (ref 96–108)
CO2 SERPL-SCNC: 29 MMOL/L — SIGNIFICANT CHANGE UP (ref 22–31)
COLOR SPEC: YELLOW — SIGNIFICANT CHANGE UP
CREAT SERPL-MCNC: 0.64 MG/DL — SIGNIFICANT CHANGE UP (ref 0.5–1.3)
CRP SERPL-MCNC: 10.4 MG/DL — HIGH
DIFF PNL FLD: NEGATIVE — SIGNIFICANT CHANGE UP
EOSINOPHIL NFR BLD AUTO: 1.6 % — SIGNIFICANT CHANGE UP (ref 0–6)
ERYTHROCYTE [SEDIMENTATION RATE] IN BLOOD: 67 MM/HR — HIGH
GLUCOSE SERPL-MCNC: 117 MG/DL — HIGH (ref 70–99)
GLUCOSE UR QL: NEGATIVE — SIGNIFICANT CHANGE UP
HCT VFR BLD CALC: 26.3 % — LOW (ref 34.5–45)
HGB BLD-MCNC: 8.7 G/DL — LOW (ref 11.5–15.5)
KETONES UR-MCNC: NEGATIVE — SIGNIFICANT CHANGE UP
LACTATE SERPL-SCNC: 0.8 MMOL/L — SIGNIFICANT CHANGE UP (ref 0.5–2)
LEUKOCYTE ESTERASE UR-ACNC: NEGATIVE — SIGNIFICANT CHANGE UP
LYMPHOCYTES # BLD AUTO: 9.1 % — LOW (ref 13–44)
MCHC RBC-ENTMCNC: 28.1 PG — SIGNIFICANT CHANGE UP (ref 27–34)
MCHC RBC-ENTMCNC: 33.1 G/DL — SIGNIFICANT CHANGE UP (ref 32–36)
MCV RBC AUTO: 84.8 FL — SIGNIFICANT CHANGE UP (ref 80–100)
MONOCYTES NFR BLD AUTO: 7.8 % — SIGNIFICANT CHANGE UP (ref 2–14)
NEUTROPHILS NFR BLD AUTO: 81.4 % — HIGH (ref 43–77)
NITRITE UR-MCNC: NEGATIVE — SIGNIFICANT CHANGE UP
PH UR: 6.5 — SIGNIFICANT CHANGE UP (ref 4–8)
PLATELET # BLD AUTO: 528 K/UL — HIGH (ref 150–400)
POTASSIUM SERPL-MCNC: 4 MMOL/L — SIGNIFICANT CHANGE UP (ref 3.5–5.3)
POTASSIUM SERPL-SCNC: 4 MMOL/L — SIGNIFICANT CHANGE UP (ref 3.5–5.3)
PROT SERPL-MCNC: 5.7 G/DL — LOW (ref 6.4–8.2)
PROT UR-MCNC: NEGATIVE MG/DL — SIGNIFICANT CHANGE UP
RBC # BLD: 3.1 M/UL — LOW (ref 3.8–5.2)
RBC # FLD: 16.1 % — SIGNIFICANT CHANGE UP (ref 10.3–16.9)
SODIUM SERPL-SCNC: 130 MMOL/L — LOW (ref 135–145)
SP GR SPEC: 1.01 — SIGNIFICANT CHANGE UP (ref 1–1.03)
UROBILINOGEN FLD QL: 0.2 E.U./DL — SIGNIFICANT CHANGE UP
WBC # BLD: 18.4 K/UL — HIGH (ref 3.8–10.5)
WBC # FLD AUTO: 18.4 K/UL — HIGH (ref 3.8–10.5)

## 2017-04-10 PROCEDURE — 87040 BLOOD CULTURE FOR BACTERIA: CPT

## 2017-04-10 PROCEDURE — 99284 EMERGENCY DEPT VISIT MOD MDM: CPT

## 2017-04-10 PROCEDURE — 99284 EMERGENCY DEPT VISIT MOD MDM: CPT | Mod: 25

## 2017-04-10 PROCEDURE — 71045 X-RAY EXAM CHEST 1 VIEW: CPT

## 2017-04-10 PROCEDURE — 87086 URINE CULTURE/COLONY COUNT: CPT

## 2017-04-10 PROCEDURE — 81003 URINALYSIS AUTO W/O SCOPE: CPT

## 2017-04-10 PROCEDURE — 83605 ASSAY OF LACTIC ACID: CPT

## 2017-04-10 PROCEDURE — 73502 X-RAY EXAM HIP UNI 2-3 VIEWS: CPT

## 2017-04-10 PROCEDURE — 80053 COMPREHEN METABOLIC PANEL: CPT

## 2017-04-10 PROCEDURE — 36415 COLL VENOUS BLD VENIPUNCTURE: CPT

## 2017-04-10 PROCEDURE — 86140 C-REACTIVE PROTEIN: CPT

## 2017-04-10 PROCEDURE — 85025 COMPLETE CBC W/AUTO DIFF WBC: CPT

## 2017-04-10 PROCEDURE — 71010: CPT | Mod: 26

## 2017-04-10 PROCEDURE — 73502 X-RAY EXAM HIP UNI 2-3 VIEWS: CPT | Mod: 26,RT

## 2017-04-10 PROCEDURE — 85652 RBC SED RATE AUTOMATED: CPT

## 2017-04-10 NOTE — ED ADULT NURSE NOTE - PLAN OF CARE
Explanation of exam/test/Bedside visitors/I and O/NPO/Position of comfort/Side rails/Fall precautions/Call bell

## 2017-04-10 NOTE — CONSULT NOTE ADULT - ATTENDING COMMENTS
Was notified by the resident  Possible reactive elevation in WBC/ESR/CRP  Will follow patient closely - communicate with rehab

## 2017-04-10 NOTE — ED ADULT NURSE NOTE - CAS EDP DISCH TYPE
Home Assisted living facility/[facility].  Nursing report given to LAWRENCE Tinoco [phone]

## 2017-04-10 NOTE — ED ADULT NURSE REASSESSMENT NOTE - NS ED NURSE REASSESS COMMENT FT1
Pt rounding performed per protocol.  Pt remains resting comfortably in bed in ER rm 5.  Pt in no apparent distress. Patient provided with emotional support, comfort, safety, and POC reviewed.  Orthopedic surgery consult at bedside with patient and wound vac removed.  Wound care and dressing changed performed per ortho surg. POC reviewed with pt and pt verbalized understanding.  Pt denies additional needs at this time. Will continue to monitor.

## 2017-04-10 NOTE — ED ADULT NURSE NOTE - CAS EDN DISCHARGE INTERVENTIONS
IV discontinued, cath removed intact IV discontinued, cath removed intact/Crouch cath removed intact and per protocol.  Pt sidney well

## 2017-04-10 NOTE — ED ADULT NURSE REASSESSMENT NOTE - COMFORT CARE
plan of care explained/darkened lights/treatment delay explained/wait time explained/po fluids offered/side rails up/repositioned

## 2017-04-10 NOTE — CONSULT NOTE ADULT - SUBJECTIVE AND OBJECTIVE BOX
Orthopaedic Consult Note    Consult Requested by: ED  Surgeon: Dr Escalante    CC:Patient is a 86y old  Female who presents w elevated WBC of 18    HPI  86yFemale [facility] ED postop day 7 (sx on 4/3) from R hip IMN due to fracture. Patient denies any symptoms of urinary tract itching or signs of infection, as well as any new onset chest tightness or change from history of coughing. Denies productive coughing.    PAST MEDICAL & SURGICAL HISTORY:  Glaucoma  S/P ORIF (open reduction internal fixation) fracture: right hip  No significant past surgical history    Allergies    chocolate (Unknown)  No Known Drug Allergies    Intolerances      MEDICATIONS  (STANDING):      Vital Signs Last 24 Hrs  T(C): 37.3, Max: 37.3 (04-10 @ 20:38)  T(F): 99.2, Max: 99.2 (04-10 @ 20:38)  HR: 81 (81 - 82)  BP: 126/71 (124/75 - 126/71)  BP(mean): --  RR: 18 (18 - 18)  SpO2: 95% (95% - 95%)    Physical Exam:  R hip demonstrating mild swelling at surgical site.  There is a prevena dressing in place and in working order suctioning adequately.  There is no redness or warmth about R hip  Motor and sensory exam reveal adequate findings  There is mild ttp about R hip, consistent with postoperative swelling.   Prevena container does not reveal any purulency                            8.7    18.4  )-----------( 528      ( 10 Apr 2017 20:49 )             26.3     04-10    130<L>  |  92<L>  |  25<H>  ----------------------------<  117<H>  4.0   |  29  |  0.64    Ca    7.9<L>      10 Apr 2017 20:49    TPro  5.7<L>  /  Alb  2.3<L>  /  TBili  0.9  /  DBili  x   /  AST  24  /  ALT  25  /  AlkPhos  79  04-10    Imaging:     A/P: 86yFemale w/ above findings postoperatively  - R hip findings hint to postoperative changes. No infection-related findings on examination.  - Elevated CRP likely due to postoperative status  - R/O UTI vs pneumoniae, although patient denies symptomatology  - Currently afebrile  - May f/u outpatient when out of rehab  - Patient informed of findings and very low likelyhood of infection at R lower extremity.   - Pain control  - Continue rehab  Informed attending Dr. Escalante, whom performed R IMN 4/3. Orthopaedic Consult Note    Consult Requested by: ED  Surgeon: Dr Escalante    CC:Patient is a 86y old  Female who presents w elevated WBC of 18    HPI  86yFemale presents to [facility] ED postop day 7 (sx on 4/3) from R hip IMN due to fracture. Patient denies any symptoms of urinary tract itching or signs of infection, as well as any new onset chest tightness or change from history of coughing. Denies productive coughing.    PAST MEDICAL & SURGICAL HISTORY:  Glaucoma  S/P ORIF (open reduction internal fixation) fracture: right hip  No significant past surgical history    Allergies    chocolate (Unknown)  No Known Drug Allergies    Intolerances      MEDICATIONS  (STANDING):      Vital Signs Last 24 Hrs  T(C): 37.3, Max: 37.3 (04-10 @ 20:38)  T(F): 99.2, Max: 99.2 (04-10 @ 20:38)  HR: 81 (81 - 82)  BP: 126/71 (124/75 - 126/71)  BP(mean): --  RR: 18 (18 - 18)  SpO2: 95% (95% - 95%)    Physical Exam:  R hip demonstrating mild swelling at surgical site.  There is a prevena dressing in place and in working order suctioning adequately.  There is no redness or warmth about R hip  Motor and sensory exam reveal adequate findings  There is mild ttp about R hip, consistent with postoperative swelling.     - Prevena then removed and revealed good healing at surgical site with adequate granulation tissue and no signs of infection.  - Sx site cleaned with chloraprep solution and then sterile aquacel placed on top                            8.7    18.4  )-----------( 528      ( 10 Apr 2017 20:49 )             26.3     04-10    130<L>  |  92<L>  |  25<H>  ----------------------------<  117<H>  4.0   |  29  |  0.64    Ca    7.9<L>      10 Apr 2017 20:49    TPro  5.7<L>  /  Alb  2.3<L>  /  TBili  0.9  /  DBili  x   /  AST  24  /  ALT  25  /  AlkPhos  79  04-10    Imaging:     A/P: 86yFemale w/ above findings postoperatively  - R hip findings hint to postoperative changes. No infection-related findings on examination.  - Elevated CRP likely due to postoperative status  - R/O UTI vs pneumoniae, although patient denies symptomatology  - Currently afebrile  - May f/u outpatient when out of rehab  - Patient informed of findings and very low likelyhood of infection at R lower extremity.   - Pain control  - Continue rehab  - F/U next Tuesday with Dr. Escalante   Discussed with attending Dr. Escalante

## 2017-04-10 NOTE — ED ADULT NURSE NOTE - OBJECTIVE STATEMENT
Pt sent to the ER from [facility] for eval of elevated WBC count.  Pt s/p right Hip ORIF last week with wound vac in place.  Blood work today noted WBC of 18.55.  Pt notes c/o nausea and vomiting yesterday, but denies fevers, sick contacts, or travel out of the country in the last 3 weeks.  Pt incontinent of urine with attends in place.  Upon arrival to the ER, pt is AAO X 4, fully verbal and non ambulatory.  Pt requires assistance to change position in bed.  Pt assisted into a position of comfort.  Torso and extremities are warm and dry.  Multiple ecchymotic areas noted to BLE from prior phlebotomy and IVs.  Wound vac in place and right hip and dressing is D/I/O.  Mucus membranes moist and pink.    HEENT intact. +PERRLA.  Trachea midline. No JVD. Lungs sound  CTA X 4. Resps slow and even, non labored.  S1, S2. No crepitus or signs of trauma.   Abdomen soft and round, non distended, and non tender to palpation with normal active BS noted X 4.  Call bell in reach and patient's bed locked and placed in lowest position.  Pt's health aid at the bedside. Behavior is appropriate and is attentive to pt.  Pt and aid provided with emotional support, comfort, frequent rounding, and POC reviewed.  All verbalized understanding and deny additional needs at this time. Will continue to monitor.

## 2017-04-10 NOTE — ED ADULT TRIAGE NOTE - CHIEF COMPLAINT QUOTE
Pt BIBA from home reporting abnormal lab, WBC 18,550 today. Pt denies any complaints. Pt had R hip replacement one week ago.

## 2017-04-11 VITALS
TEMPERATURE: 99 F | SYSTOLIC BLOOD PRESSURE: 112 MMHG | RESPIRATION RATE: 18 BRPM | HEART RATE: 95 BPM | DIASTOLIC BLOOD PRESSURE: 67 MMHG | OXYGEN SATURATION: 95 %

## 2017-04-11 NOTE — ED PROVIDER NOTE - ATTENDING CONTRIBUTION TO CARE
86F s/p ORIF R hip presenting from nursing home with elevated wbc count. Pt denies any complaints. Vitals wnl, afebrile. WBC count elevated. UA neg, CXR neg, Incision appears c/d/i. Evaluated by ortho, no e/o hip infection or incision infection. Blood cx sent, no indication for admission for iv abx will discharge back to facility with strict return precautions.

## 2017-04-11 NOTE — ED PROVIDER NOTE - MEDICAL DECISION MAKING DETAILS
pt seen by ortho and chest and urine clean and pt feeling better will DC noted elevation in WBC but no fever no chills no signs of infection lactate normal will DC

## 2017-04-11 NOTE — ED PROVIDER NOTE - SKIN, MLM
Skin normal color for race, warm, dry and intact. No evidence of rash. no red no DC suture line intact no pain no bleeding dressing dry

## 2017-04-11 NOTE — ED PROVIDER NOTE - OBJECTIVE STATEMENT
Ms. Brennan is an 87 yo F with pmhx glaucoma, CHF, chronic shoulder pain, s/p ORIF right hip (4/3/17) presents with elevated WBC (18.55). Pt's c/o of right leg pain artributted to her hip surgery last week. Pt reports associated symptoms of nausea and generalized body aches. Pt's HHA also states that she noticed some redness around the vagina and buttocks when she was being cleaned today. Denies fever, chills, HA, dizziness, chest pain, SOB, productive cough, sore throat, abdominal pain, dysuria, hematuria, urinary frequency, vomiting, diarrhea, hematochezia, melena, rash.

## 2017-04-12 LAB
CULTURE RESULTS: NO GROWTH — SIGNIFICANT CHANGE UP
SPECIMEN SOURCE: SIGNIFICANT CHANGE UP

## 2017-04-15 LAB
CULTURE RESULTS: SIGNIFICANT CHANGE UP
CULTURE RESULTS: SIGNIFICANT CHANGE UP
SPECIMEN SOURCE: SIGNIFICANT CHANGE UP
SPECIMEN SOURCE: SIGNIFICANT CHANGE UP

## 2017-04-18 ENCOUNTER — OUTPATIENT (OUTPATIENT)
Dept: OUTPATIENT SERVICES | Facility: HOSPITAL | Age: 82
LOS: 1 days | End: 2017-04-18
Payer: MEDICARE

## 2017-04-18 DIAGNOSIS — Z96.7 PRESENCE OF OTHER BONE AND TENDON IMPLANTS: Chronic | ICD-10-CM

## 2017-04-18 PROCEDURE — 73502 X-RAY EXAM HIP UNI 2-3 VIEWS: CPT

## 2017-04-18 PROCEDURE — 73502 X-RAY EXAM HIP UNI 2-3 VIEWS: CPT | Mod: 26,RT

## 2017-04-25 ENCOUNTER — OUTPATIENT (OUTPATIENT)
Dept: OUTPATIENT SERVICES | Facility: HOSPITAL | Age: 82
LOS: 1 days | End: 2017-04-25
Payer: MEDICARE

## 2017-04-25 DIAGNOSIS — Z96.7 PRESENCE OF OTHER BONE AND TENDON IMPLANTS: Chronic | ICD-10-CM

## 2017-04-25 PROCEDURE — 73551 X-RAY EXAM OF FEMUR 1: CPT | Mod: 26,RT

## 2017-04-25 PROCEDURE — 73551 X-RAY EXAM OF FEMUR 1: CPT

## 2017-05-09 ENCOUNTER — OUTPATIENT (OUTPATIENT)
Dept: OUTPATIENT SERVICES | Facility: HOSPITAL | Age: 82
LOS: 1 days | End: 2017-05-09
Payer: MEDICARE

## 2017-05-09 DIAGNOSIS — Z96.7 PRESENCE OF OTHER BONE AND TENDON IMPLANTS: Chronic | ICD-10-CM

## 2017-05-09 PROCEDURE — 73552 X-RAY EXAM OF FEMUR 2/>: CPT | Mod: 26,RT

## 2017-05-09 PROCEDURE — 73551 X-RAY EXAM OF FEMUR 1: CPT

## 2017-05-09 PROCEDURE — 73551 X-RAY EXAM OF FEMUR 1: CPT | Mod: 26,RT

## 2018-01-26 PROBLEM — Z00.00 ENCOUNTER FOR PREVENTIVE HEALTH EXAMINATION: Status: ACTIVE | Noted: 2018-01-26

## 2018-01-31 ENCOUNTER — OUTPATIENT (OUTPATIENT)
Dept: OUTPATIENT SERVICES | Facility: HOSPITAL | Age: 83
LOS: 1 days | End: 2018-01-31

## 2018-01-31 ENCOUNTER — APPOINTMENT (OUTPATIENT)
Dept: OPHTHALMOLOGY | Facility: CLINIC | Age: 83
End: 2018-01-31

## 2018-01-31 DIAGNOSIS — Z96.7 PRESENCE OF OTHER BONE AND TENDON IMPLANTS: Chronic | ICD-10-CM

## 2018-02-01 DIAGNOSIS — H26.492 OTHER SECONDARY CATARACT, LEFT EYE: ICD-10-CM

## 2022-02-15 NOTE — PATIENT PROFILE ADULT. - AS SC BRADEN FRICTION
[FreeTextEntry1] : no angina, neg perfusion\par needs to exercise and lose wt\par repeat lfts on lower statin- if still abnl will get hepatic US\par f/u in 3mo (2) potential problem

## 2022-04-20 NOTE — CONSULT NOTE ADULT - PROBLEM/RECOMMENDATION-4
Patient is requesting a refill on her  amphetamine-dextroamphetamine (ADDERALL) 30 MG tablet    Pharmacy [facility]   DISPLAY PLAN FREE TEXT

## 2023-02-07 NOTE — ED ADULT TRIAGE NOTE - BP NONINVASIVE SYSTOLIC (MM HG)
Patient  is stable with current medication and we discussed a low fat low cholesterol diet  Weight loss also discussed for this will help lower cholesterol also  Recheck lipids in 6 months  122

## 2024-05-21 NOTE — CONSULT NOTE ADULT - CONSTITUTIONAL
Physical Therapy Daily Treatment Note    Date: 2024  Patient Name: Georgina Andrade  : 1968   MRN: [id number]  DOInjury: summer 2023  DOSx: 2024     Referring Provider:   [place] [address]       Medical Diagnosis:   S46.011A (ICD-10-CM) - Traumatic tear of right rotator cuff, initial encounter    Outcome Measure:  QuickDASH 57% disability    Georgina has marked pain, limited A/PROM, and weakness s/p R RTC repair 2024. I did start with gentle ROM ex to decrease pain and facilitate ROM. We will need to provide considerable emotional support especially early on.    X = TO BE PERFORMED NEXT VISIT  > = PROGRESS TO THIS    S: Patient reports being very sore again today , but was still doing a lot of things in her yard lately .               ( Encouraged her to continue trying to use it around home  ,feeding herself, brushing teeth) as long as she is not weight bearing, pushing or pulling.  8 weeks post op ) .  O: Discussed anatomy, physiology, body mechanics, principles of loading, and progressive loading/activity. Reviewed home exercise program extensively; written copy provided.   Access Code: [email or web address]  URL: [email or web address]  Date: 2024  Prepared by: Rico José    Exercises  - Seated Shoulder Flexion Towel Slide at Table Top Full Range of Motion  - 3 x daily - 7 x weekly - 2 sets - 15-20 reps  - Seated Shoulder Abduction Towel Slide at Table Top with Forearm in Neutral (Mirrored)  - 3 x daily - 7 x weekly - 2 sets - 15-20 reps  - Standing Bilateral Shoulder Internal Rotation AAROM with Dowel  - 3 x daily - 7 x weekly - 2 sets - 15-20 reps  - Circular Shoulder Pendulum with Table Support  - 3 x daily - 7 x weekly - 20 reps  Time 6426-1363 am     Visit 10 / 20 visits  Repeat outcome measure at mid point and end.    Pain Pain 8/10     ROM Right Shoulder: pain markedly limiting ROM  AROM:    90-95° Forward elevation, 10° ER,  IR to R  detailed exam show

## 2025-01-02 NOTE — PROGRESS NOTE ADULT - PROBLEM SELECTOR PLAN 4
[facility]
she is stable postop and can discontinue the monitor tomorrow
she is stable postop and can discontinue the monitor tomorrow
she is optimized for the surgery.  The patient was cleared by cardiology

## 2025-02-04 NOTE — ED PROVIDER NOTE - CARE PLAN
-Check BP daily for the next week. Return in 1 week for nurse visit to have BP checked and bring BP logs.    Principal Discharge DX:	Other elevated white blood cell (WBC) count

## 2025-04-14 NOTE — PHYSICAL THERAPY INITIAL EVALUATION ADULT - FUNCTIONAL LIMITATIONS, PT EVAL
Orders:  •  CBC and differential; Future  •  TIBC Panel (incl. Iron, TIBC, % Iron Saturation); Future   self-care